# Patient Record
Sex: FEMALE | Race: WHITE | NOT HISPANIC OR LATINO | Employment: OTHER | URBAN - METROPOLITAN AREA
[De-identification: names, ages, dates, MRNs, and addresses within clinical notes are randomized per-mention and may not be internally consistent; named-entity substitution may affect disease eponyms.]

---

## 2023-07-19 LAB
AMPHETAMINE (PRESENCE) IN URINE BY SCREEN METHOD: ABNORMAL
BARBITURATES PRESENCE IN URINE BY SCREEN METHOD: ABNORMAL
BENZODIAZEPINE (PRESENCE) IN URINE BY SCREEN METHOD: ABNORMAL
CANNABINOIDS IN URINE BY SCREEN METHOD: ABNORMAL
COCAINE (PRESENCE) IN URINE BY SCREEN METHOD: ABNORMAL
DRUG SCREEN COMMENT URINE: ABNORMAL
FENTANYL URINE: ABNORMAL
METHADONE (PRESENCE) IN URINE BY SCREEN METHOD: ABNORMAL
OPIATES (PRESENCE) IN URINE BY SCREEN METHOD: ABNORMAL
OXYCODONE (PRESENCE) IN URINE BY SCREEN METHOD: ABNORMAL
PHENCYCLIDINE (PRESENCE) IN URINE BY SCREEN METHOD: ABNORMAL

## 2023-07-27 LAB
AMPHETAMINES,URINE: <50 NG/ML
MDA,URINE: <200 NG/ML
MDEA,URINE: <200 NG/ML
MDMA,UR: <200 NG/ML
METHAMPHETAMINE QUANTITATIVE URINE: <200 NG/ML
PHENTERMINE,UR: <200 NG/ML

## 2023-10-04 DIAGNOSIS — I47.29 PAROXYSMAL VENTRICULAR TACHYCARDIA (MULTI): ICD-10-CM

## 2023-10-04 DIAGNOSIS — Z95.810 PRESENCE OF AUTOMATIC CARDIOVERTER/DEFIBRILLATOR (AICD): ICD-10-CM

## 2023-10-18 DIAGNOSIS — I47.10 PAROXYSMAL SUPRAVENTRICULAR TACHYCARDIA (CMS-HCC): ICD-10-CM

## 2023-10-18 DIAGNOSIS — Z95.810 PRESENCE OF AUTOMATIC CARDIOVERTER/DEFIBRILLATOR (AICD): ICD-10-CM

## 2023-10-18 DIAGNOSIS — I47.29 PAROXYSMAL VENTRICULAR TACHYCARDIA (MULTI): ICD-10-CM

## 2023-11-01 ENCOUNTER — HOSPITAL ENCOUNTER (OUTPATIENT)
Dept: CARDIOLOGY | Facility: CLINIC | Age: 83
Discharge: HOME | End: 2023-11-01
Payer: MEDICARE

## 2023-11-01 DIAGNOSIS — Z95.810 PRESENCE OF AUTOMATIC CARDIOVERTER/DEFIBRILLATOR (AICD): ICD-10-CM

## 2023-11-01 DIAGNOSIS — I47.10 PAROXYSMAL SUPRAVENTRICULAR TACHYCARDIA (CMS-HCC): ICD-10-CM

## 2023-11-01 DIAGNOSIS — I47.29 PAROXYSMAL VENTRICULAR TACHYCARDIA (MULTI): ICD-10-CM

## 2023-11-07 ENCOUNTER — HOSPITAL ENCOUNTER (OUTPATIENT)
Dept: CARDIOLOGY | Facility: CLINIC | Age: 83
Discharge: HOME | End: 2023-11-07
Payer: MEDICARE

## 2023-11-07 DIAGNOSIS — I47.29 PAROXYSMAL VENTRICULAR TACHYCARDIA (MULTI): ICD-10-CM

## 2023-11-07 DIAGNOSIS — Z95.810 PRESENCE OF AUTOMATIC CARDIOVERTER/DEFIBRILLATOR (AICD): ICD-10-CM

## 2023-11-20 ENCOUNTER — HOSPITAL ENCOUNTER (OUTPATIENT)
Dept: CARDIOLOGY | Facility: CLINIC | Age: 83
Discharge: HOME | End: 2023-11-20
Payer: MEDICARE

## 2023-11-20 DIAGNOSIS — Z95.810 PRESENCE OF AUTOMATIC CARDIOVERTER/DEFIBRILLATOR (AICD): ICD-10-CM

## 2023-11-20 DIAGNOSIS — I47.29 PAROXYSMAL VENTRICULAR TACHYCARDIA (MULTI): ICD-10-CM

## 2023-11-27 ENCOUNTER — HOSPITAL ENCOUNTER (OUTPATIENT)
Dept: CARDIOLOGY | Facility: CLINIC | Age: 83
Discharge: HOME | End: 2023-11-27
Payer: MEDICARE

## 2023-11-27 DIAGNOSIS — I47.29 PAROXYSMAL VENTRICULAR TACHYCARDIA (MULTI): ICD-10-CM

## 2023-11-27 DIAGNOSIS — Z95.810 PRESENCE OF AUTOMATIC CARDIOVERTER/DEFIBRILLATOR (AICD): ICD-10-CM

## 2023-12-11 ENCOUNTER — HOSPITAL ENCOUNTER (OUTPATIENT)
Dept: CARDIOLOGY | Facility: CLINIC | Age: 83
Discharge: HOME | End: 2023-12-11
Payer: MEDICARE

## 2023-12-11 DIAGNOSIS — I47.29 PAROXYSMAL VENTRICULAR TACHYCARDIA (MULTI): ICD-10-CM

## 2023-12-11 DIAGNOSIS — Z95.810 PRESENCE OF AUTOMATIC CARDIOVERTER/DEFIBRILLATOR (AICD): ICD-10-CM

## 2023-12-27 ENCOUNTER — HOSPITAL ENCOUNTER (OUTPATIENT)
Dept: CARDIOLOGY | Facility: CLINIC | Age: 83
Discharge: HOME | End: 2023-12-27
Payer: MEDICARE

## 2023-12-27 DIAGNOSIS — I47.29 PAROXYSMAL VENTRICULAR TACHYCARDIA (MULTI): ICD-10-CM

## 2023-12-27 DIAGNOSIS — Z95.810 PRESENCE OF AUTOMATIC CARDIOVERTER/DEFIBRILLATOR (AICD): ICD-10-CM

## 2023-12-27 PROCEDURE — 93296 REM INTERROG EVL PM/IDS: CPT

## 2023-12-27 PROCEDURE — 93295 DEV INTERROG REMOTE 1/2/MLT: CPT | Performed by: INTERNAL MEDICINE

## 2024-01-02 ENCOUNTER — HOSPITAL ENCOUNTER (OUTPATIENT)
Dept: CARDIOLOGY | Facility: CLINIC | Age: 84
Discharge: HOME | End: 2024-01-02
Payer: MEDICARE

## 2024-01-02 DIAGNOSIS — I47.29 PAROXYSMAL VENTRICULAR TACHYCARDIA (MULTI): ICD-10-CM

## 2024-01-02 DIAGNOSIS — Z95.810 PRESENCE OF AUTOMATIC CARDIOVERTER/DEFIBRILLATOR (AICD): ICD-10-CM

## 2024-01-08 ENCOUNTER — HOSPITAL ENCOUNTER (OUTPATIENT)
Dept: CARDIOLOGY | Facility: CLINIC | Age: 84
Discharge: HOME | End: 2024-01-08
Payer: MEDICARE

## 2024-01-08 DIAGNOSIS — Z95.810 PRESENCE OF AUTOMATIC CARDIOVERTER/DEFIBRILLATOR (AICD): ICD-10-CM

## 2024-01-08 DIAGNOSIS — I47.29 PAROXYSMAL VENTRICULAR TACHYCARDIA (MULTI): ICD-10-CM

## 2024-01-10 ENCOUNTER — HOSPITAL ENCOUNTER (OUTPATIENT)
Dept: CARDIOLOGY | Facility: CLINIC | Age: 84
Discharge: HOME | End: 2024-01-10
Payer: MEDICARE

## 2024-01-10 DIAGNOSIS — I47.29 PAROXYSMAL VENTRICULAR TACHYCARDIA (MULTI): ICD-10-CM

## 2024-01-10 DIAGNOSIS — Z95.810 PRESENCE OF AUTOMATIC CARDIOVERTER/DEFIBRILLATOR (AICD): ICD-10-CM

## 2024-01-17 ENCOUNTER — HOSPITAL ENCOUNTER (OUTPATIENT)
Dept: CARDIOLOGY | Facility: CLINIC | Age: 84
Discharge: HOME | End: 2024-01-17
Payer: MEDICARE

## 2024-01-17 DIAGNOSIS — I47.29 PAROXYSMAL VENTRICULAR TACHYCARDIA (MULTI): ICD-10-CM

## 2024-01-17 DIAGNOSIS — Z95.810 PRESENCE OF AUTOMATIC CARDIOVERTER/DEFIBRILLATOR (AICD): ICD-10-CM

## 2024-01-25 ENCOUNTER — HOSPITAL ENCOUNTER (OUTPATIENT)
Dept: CARDIOLOGY | Facility: CLINIC | Age: 84
Discharge: HOME | End: 2024-01-25
Payer: MEDICARE

## 2024-01-25 DIAGNOSIS — I47.29 PAROXYSMAL VENTRICULAR TACHYCARDIA (MULTI): ICD-10-CM

## 2024-01-25 DIAGNOSIS — Z95.810 PRESENCE OF AUTOMATIC CARDIOVERTER/DEFIBRILLATOR (AICD): ICD-10-CM

## 2024-01-29 ENCOUNTER — HOSPITAL ENCOUNTER (OUTPATIENT)
Dept: CARDIOLOGY | Facility: CLINIC | Age: 84
Discharge: HOME | End: 2024-01-29
Payer: MEDICARE

## 2024-01-29 DIAGNOSIS — I47.29 PAROXYSMAL VENTRICULAR TACHYCARDIA (MULTI): ICD-10-CM

## 2024-01-29 DIAGNOSIS — Z95.810 PRESENCE OF AUTOMATIC CARDIOVERTER/DEFIBRILLATOR (AICD): ICD-10-CM

## 2024-01-29 DIAGNOSIS — I47.10 PAROXYSMAL SUPRAVENTRICULAR TACHYCARDIA (CMS-HCC): ICD-10-CM

## 2024-01-31 DIAGNOSIS — I47.10 PAROXYSMAL SUPRAVENTRICULAR TACHYCARDIA (CMS-HCC): ICD-10-CM

## 2024-01-31 RX ORDER — MEXILETINE HYDROCHLORIDE 250 MG/1
250 CAPSULE ORAL 3 TIMES DAILY
Qty: 270 CAPSULE | Refills: 3 | Status: SHIPPED | OUTPATIENT
Start: 2024-01-31

## 2024-01-31 RX ORDER — MEXILETINE HYDROCHLORIDE 250 MG/1
250 CAPSULE ORAL 3 TIMES DAILY
COMMUNITY
End: 2024-01-31 | Stop reason: SDUPTHER

## 2024-02-05 ENCOUNTER — HOSPITAL ENCOUNTER (OUTPATIENT)
Dept: CARDIOLOGY | Facility: CLINIC | Age: 84
Discharge: HOME | End: 2024-02-05
Payer: MEDICARE

## 2024-02-05 DIAGNOSIS — Z95.810 PRESENCE OF AUTOMATIC CARDIOVERTER/DEFIBRILLATOR (AICD): ICD-10-CM

## 2024-02-05 DIAGNOSIS — I47.29 PAROXYSMAL VENTRICULAR TACHYCARDIA (MULTI): ICD-10-CM

## 2024-02-12 ENCOUNTER — HOSPITAL ENCOUNTER (OUTPATIENT)
Dept: CARDIOLOGY | Facility: CLINIC | Age: 84
Discharge: HOME | End: 2024-02-12
Payer: MEDICARE

## 2024-02-12 DIAGNOSIS — I47.29 PAROXYSMAL VENTRICULAR TACHYCARDIA (MULTI): ICD-10-CM

## 2024-02-19 DIAGNOSIS — I47.29 PAROXYSMAL VENTRICULAR TACHYCARDIA (MULTI): ICD-10-CM

## 2024-02-19 RX ORDER — METOPROLOL SUCCINATE 50 MG/1
50 TABLET, EXTENDED RELEASE ORAL DAILY
COMMUNITY
End: 2024-02-19 | Stop reason: SDUPTHER

## 2024-02-20 RX ORDER — METOPROLOL SUCCINATE 50 MG/1
50 TABLET, EXTENDED RELEASE ORAL DAILY
Qty: 90 TABLET | Refills: 3 | Status: SHIPPED | OUTPATIENT
Start: 2024-02-20 | End: 2025-02-19

## 2024-02-21 ENCOUNTER — HOSPITAL ENCOUNTER (OUTPATIENT)
Dept: CARDIOLOGY | Facility: CLINIC | Age: 84
Discharge: HOME | End: 2024-02-21
Payer: MEDICARE

## 2024-02-21 DIAGNOSIS — I47.29 PAROXYSMAL VENTRICULAR TACHYCARDIA (MULTI): ICD-10-CM

## 2024-02-21 DIAGNOSIS — Z95.810 PRESENCE OF AUTOMATIC CARDIOVERTER/DEFIBRILLATOR (AICD): ICD-10-CM

## 2024-02-24 DIAGNOSIS — E03.9 HYPOTHYROIDISM, UNSPECIFIED: ICD-10-CM

## 2024-02-26 ENCOUNTER — HOSPITAL ENCOUNTER (OUTPATIENT)
Dept: CARDIOLOGY | Facility: CLINIC | Age: 84
Discharge: HOME | End: 2024-02-26
Payer: MEDICARE

## 2024-02-26 DIAGNOSIS — I47.29 PAROXYSMAL VENTRICULAR TACHYCARDIA (MULTI): ICD-10-CM

## 2024-02-26 DIAGNOSIS — Z95.810 PRESENCE OF AUTOMATIC CARDIOVERTER/DEFIBRILLATOR (AICD): ICD-10-CM

## 2024-02-26 RX ORDER — LEVOTHYROXINE SODIUM 125 UG/1
125 TABLET ORAL DAILY
Qty: 90 TABLET | Refills: 0 | Status: SHIPPED | OUTPATIENT
Start: 2024-02-26 | End: 2024-04-17 | Stop reason: SDUPTHER

## 2024-02-28 ENCOUNTER — HOSPITAL ENCOUNTER (OUTPATIENT)
Dept: CARDIOLOGY | Facility: CLINIC | Age: 84
Discharge: HOME | End: 2024-02-28
Payer: MEDICARE

## 2024-02-28 DIAGNOSIS — Z95.810 PRESENCE OF AUTOMATIC CARDIOVERTER/DEFIBRILLATOR (AICD): ICD-10-CM

## 2024-02-28 DIAGNOSIS — I47.29 PAROXYSMAL VENTRICULAR TACHYCARDIA (MULTI): ICD-10-CM

## 2024-03-03 PROCEDURE — 87186 SC STD MICRODIL/AGAR DIL: CPT

## 2024-03-03 PROCEDURE — 87086 URINE CULTURE/COLONY COUNT: CPT

## 2024-03-04 ENCOUNTER — LAB REQUISITION (OUTPATIENT)
Dept: LAB | Facility: HOSPITAL | Age: 84
End: 2024-03-04
Payer: MEDICARE

## 2024-03-04 ENCOUNTER — HOSPITAL ENCOUNTER (OUTPATIENT)
Dept: CARDIOLOGY | Facility: CLINIC | Age: 84
Discharge: HOME | End: 2024-03-04
Payer: MEDICARE

## 2024-03-04 ENCOUNTER — APPOINTMENT (OUTPATIENT)
Dept: CARDIOLOGY | Facility: CLINIC | Age: 84
End: 2024-03-04
Payer: MEDICARE

## 2024-03-04 DIAGNOSIS — I47.29 PAROXYSMAL VENTRICULAR TACHYCARDIA (MULTI): ICD-10-CM

## 2024-03-04 DIAGNOSIS — Z95.810 PRESENCE OF AUTOMATIC CARDIOVERTER/DEFIBRILLATOR (AICD): ICD-10-CM

## 2024-03-04 DIAGNOSIS — N39.0 URINARY TRACT INFECTION, SITE NOT SPECIFIED: ICD-10-CM

## 2024-03-07 ENCOUNTER — HOSPITAL ENCOUNTER (OUTPATIENT)
Dept: CARDIOLOGY | Facility: CLINIC | Age: 84
Discharge: HOME | End: 2024-03-07
Payer: MEDICARE

## 2024-03-07 ENCOUNTER — OFFICE VISIT (OUTPATIENT)
Dept: PRIMARY CARE | Facility: CLINIC | Age: 84
End: 2024-03-07
Payer: MEDICARE

## 2024-03-07 VITALS
WEIGHT: 154 LBS | HEIGHT: 67 IN | BODY MASS INDEX: 24.17 KG/M2 | HEART RATE: 60 BPM | DIASTOLIC BLOOD PRESSURE: 72 MMHG | SYSTOLIC BLOOD PRESSURE: 119 MMHG | TEMPERATURE: 96.6 F

## 2024-03-07 DIAGNOSIS — L30.4 INTERTRIGO: Primary | ICD-10-CM

## 2024-03-07 DIAGNOSIS — I49.01 VENTRICULAR FIBRILLATION (MULTI): ICD-10-CM

## 2024-03-07 DIAGNOSIS — Z95.810 PRESENCE OF AUTOMATIC CARDIOVERTER/DEFIBRILLATOR (AICD): ICD-10-CM

## 2024-03-07 DIAGNOSIS — I47.29 PAROXYSMAL VENTRICULAR TACHYCARDIA (MULTI): ICD-10-CM

## 2024-03-07 PROBLEM — I73.9 PERIPHERAL VASCULAR DISEASE, UNSPECIFIED (CMS-HCC): Status: ACTIVE | Noted: 2024-03-07

## 2024-03-07 PROBLEM — I50.32 CHRONIC DIASTOLIC (CONGESTIVE) HEART FAILURE (MULTI): Status: ACTIVE | Noted: 2024-03-07

## 2024-03-07 LAB — BACTERIA UR CULT: ABNORMAL

## 2024-03-07 PROCEDURE — 99214 OFFICE O/P EST MOD 30 MIN: CPT | Performed by: INTERNAL MEDICINE

## 2024-03-07 PROCEDURE — 1036F TOBACCO NON-USER: CPT | Performed by: INTERNAL MEDICINE

## 2024-03-07 PROCEDURE — 1159F MED LIST DOCD IN RCRD: CPT | Performed by: INTERNAL MEDICINE

## 2024-03-07 PROCEDURE — 1157F ADVNC CARE PLAN IN RCRD: CPT | Performed by: INTERNAL MEDICINE

## 2024-03-07 RX ORDER — CHOLECALCIFEROL (VITAMIN D3) 50 MCG
2000 TABLET ORAL DAILY
COMMUNITY

## 2024-03-07 RX ORDER — LOSARTAN POTASSIUM 50 MG/1
50 TABLET ORAL DAILY
COMMUNITY
End: 2024-04-29 | Stop reason: SDUPTHER

## 2024-03-07 RX ORDER — NAPROXEN SODIUM 220 MG/1
81 TABLET, FILM COATED ORAL DAILY
COMMUNITY

## 2024-03-07 RX ORDER — NITROFURANTOIN 25; 75 MG/1; MG/1
100 CAPSULE ORAL EVERY 12 HOURS SCHEDULED
COMMUNITY
Start: 2024-03-03 | End: 2024-04-17 | Stop reason: ALTCHOICE

## 2024-03-07 RX ORDER — PRAVASTATIN SODIUM 20 MG/1
20 TABLET ORAL EVERY OTHER DAY
COMMUNITY
Start: 2020-03-12 | End: 2024-04-17 | Stop reason: ALTCHOICE

## 2024-03-07 RX ORDER — CLOTRIMAZOLE AND BETAMETHASONE DIPROPIONATE 10; .64 MG/G; MG/G
1 CREAM TOPICAL 2 TIMES DAILY
Qty: 15 G | Refills: 0 | Status: SHIPPED | OUTPATIENT
Start: 2024-03-07 | End: 2024-03-12 | Stop reason: SDUPTHER

## 2024-03-07 RX ORDER — AA/PROT/LYSINE/METHIO/VIT C/B6 50-12.5 MG
20 TABLET ORAL DAILY
COMMUNITY

## 2024-03-07 RX ORDER — ALPRAZOLAM 0.25 MG/1
0.25 TABLET ORAL 2 TIMES DAILY
COMMUNITY

## 2024-03-07 RX ORDER — VITAMIN E MIXED 400 UNIT
800 CAPSULE ORAL DAILY
COMMUNITY

## 2024-03-07 RX ORDER — LANOLIN ALCOHOL/MO/W.PET/CERES
1 CREAM (GRAM) TOPICAL DAILY
COMMUNITY
Start: 2019-08-01

## 2024-03-07 RX ORDER — AMLODIPINE BESYLATE 2.5 MG/1
2.5 TABLET ORAL 2 TIMES DAILY
COMMUNITY

## 2024-03-07 ASSESSMENT — PATIENT HEALTH QUESTIONNAIRE - PHQ9
SUM OF ALL RESPONSES TO PHQ9 QUESTIONS 1 AND 2: 0
2. FEELING DOWN, DEPRESSED OR HOPELESS: NOT AT ALL
1. LITTLE INTEREST OR PLEASURE IN DOING THINGS: NOT AT ALL

## 2024-03-07 NOTE — PROGRESS NOTES
Subjective   Patient ID: Kaleigh Strong is a 83 y.o. female who presents for redness (Pubic area. Has taken Diflucan x 2, has used wipes to help with itching and nothing is helping) and Vaginal Itching.    HPI Pt is a pleasant  83 y.o. CF who was seen and evaluated during the OV with the hx of the itching at the groin area for the last week. Pt states that she was seen at the local  for this issue. Pt was treated with 2 doses of Diflucan and has no relief of the sxs. Pt denies any vaginal discharge/no STI exposure  During the clinical encounter pt denies fever, chills, no SOB, no chest pain/tightness, no abdominal pain, no N/V/D reported, no change of urination reported. Pt denies any other health concerns during this visit.  Sohx: reviewed  PMHx and Fhx: reviewed    Review of Systems  12 Systems have been reviewed as follows:   Constitutional: no fever, no chills  Eyes: no eyesight problems, no eye redness, no eye pain, no blurred vision  ENT: no ear pain or sore throat, no nasal discharge or congestion, no sinus pressure, no hearing loss  Cardiovascular: no chest pain or tightness, no SOB, the heart rate was not fast or slow  Respiratory: no cough, no dyspnea with exertion or with rest, no wheezing  Gastrointestinal: no abdominal pain, no constipation or diarrhea, no heartburn, no nausea or vomiting  Genitourinary: no urine frequency, no dysuria, no urinary urgency, no urinary incontinence or retention  Musculoskeletal: no back pain, no arthralgia, no joint swelling or stiffness, no muscle weakness  Integumentary: as mentioned  Neurological: no headaches, no dizziness or fainting, no limb weakness  Psychiatric: no mood changes, no anxiety or depression, no suicidal thoughts  Endocrine: no weight change, no temperature intolerance, no change of appetite  Hematologic/Lymphatic: no easy bruising or bleeding  Objective   /72 (BP Location: Right arm, Patient Position: Sitting)   Pulse 60   Temp 35.9 °C (96.6  "°F)   Ht 1.702 m (5' 7\")   Wt 69.9 kg (154 lb)   BMI 24.12 kg/m²     Physical Exam  Constitutional: well hydrated, well nourished, no acute distress. Vital signs reviewed  Head and face: normocephalic, atraumatic  Eyes: no erythema, edema or visible abnormalities of conjunctiva or lids. Pupils are equal, round and reactive to light. Extraocular movement normal  ENT: external ears without deformities  Neck: full ROM, no adenopathy, neck was supple  Pulmonary: normal respiratory rate and effort. Lungs are clear to auscultation, no rales,no rhonchi or wheezing  Cardiovascular: RRR, normal S1 and S2, no murmur, no gallop, posterior tib. pulse normal 2+ bilaterally, no lower extremity edema  Abdomen: soft, non tender on palpation, not distended, normal BS in all 4 quadrants  Musculoskeletal: no joint swelling, normal movements of all 4 extremities. Gait and station: pt is using a walker. Digits and nails: normal without clubbing  Skin: The gorin region evaluation was done after the verbal consent was secured form the pt. The PE revealed the area of skin erythema, diffuse moist patches in the left and right groin region (more prominent on the right side). No pain/fluctuation on palpation.  Neurologic: Cranial nerves: CN II: visual acuity intact. Source: acuity reported by patient. Pupils reactive to light with normal accommodation. Right and left pupil normal CN III, IV and VI: the EO movements were intact. CN VIII: no hearing loss.  Sensory exam: normal light to touch  Psychiatric: Mood and affect: normal, Alert and Oriented x 3  Lymphatic: no cervical lymphadenopathy  Assessment/Plan   Itchy skin rash, most likely Intertrigo:  Hx and PE as mentioned  Will start on 14 day Tx with clotrimazole betamethasone cream BID  Pt was instructed to contact PCP if pt will have no improvement of the condition/worsening of the sxs/new sxs on the current treatment  Plan was d/c with the pt in details, verbalized understanding, " agreed    Vfib: continue on metoprolol and Mexitil as prescribed  Please follow up with PCP as planned or sooner if needed

## 2024-03-08 PROBLEM — I47.10 PAROXYSMAL SUPRAVENTRICULAR TACHYCARDIA (CMS-HCC): Status: ACTIVE | Noted: 2024-03-07

## 2024-03-08 PROBLEM — I47.29 OTHER VENTRICULAR TACHYCARDIA (MULTI): Status: RESOLVED | Noted: 2024-03-07 | Resolved: 2024-03-08

## 2024-03-12 ENCOUNTER — TELEPHONE (OUTPATIENT)
Dept: CARDIOLOGY | Facility: CLINIC | Age: 84
End: 2024-03-12
Payer: MEDICARE

## 2024-03-12 DIAGNOSIS — L30.4 INTERTRIGO: ICD-10-CM

## 2024-03-12 RX ORDER — CLOTRIMAZOLE AND BETAMETHASONE DIPROPIONATE 10; .64 MG/G; MG/G
1 CREAM TOPICAL 2 TIMES DAILY
Qty: 15 G | Refills: 0 | Status: CANCELLED | OUTPATIENT
Start: 2024-03-12 | End: 2024-03-26

## 2024-03-12 RX ORDER — CLOTRIMAZOLE AND BETAMETHASONE DIPROPIONATE 10; .64 MG/G; MG/G
1 CREAM TOPICAL 2 TIMES DAILY
Qty: 15 G | Refills: 0 | Status: SHIPPED | OUTPATIENT
Start: 2024-03-12 | End: 2024-03-26

## 2024-03-12 NOTE — TELEPHONE ENCOUNTER
"Patient left voicemail stating she was seen in office on 3/7 for yeast infection. She stated the infection was \"so severe\" that she used all the medication (clotrimazole-betamethasone cream) that was prescribed, \"too fast\" and would like another Rx if possible  "

## 2024-03-13 ENCOUNTER — OFFICE VISIT (OUTPATIENT)
Dept: CARDIOLOGY | Facility: CLINIC | Age: 84
End: 2024-03-13
Payer: MEDICARE

## 2024-03-13 ENCOUNTER — HOSPITAL ENCOUNTER (OUTPATIENT)
Dept: CARDIOLOGY | Facility: CLINIC | Age: 84
Discharge: HOME | End: 2024-03-13
Payer: MEDICARE

## 2024-03-13 VITALS
SYSTOLIC BLOOD PRESSURE: 110 MMHG | OXYGEN SATURATION: 96 % | BODY MASS INDEX: 24.17 KG/M2 | HEIGHT: 67 IN | DIASTOLIC BLOOD PRESSURE: 64 MMHG | HEART RATE: 76 BPM | WEIGHT: 154 LBS

## 2024-03-13 DIAGNOSIS — I47.29 OTHER VENTRICULAR TACHYCARDIA (MULTI): ICD-10-CM

## 2024-03-13 DIAGNOSIS — I50.32 CHRONIC DIASTOLIC (CONGESTIVE) HEART FAILURE (MULTI): Primary | ICD-10-CM

## 2024-03-13 DIAGNOSIS — I50.22 CHRONIC SYSTOLIC HEART FAILURE (MULTI): ICD-10-CM

## 2024-03-13 DIAGNOSIS — Z95.810 PRESENCE OF AUTOMATIC (IMPLANTABLE) CARDIAC DEFIBRILLATOR: ICD-10-CM

## 2024-03-13 DIAGNOSIS — I47.29 PAROXYSMAL VENTRICULAR TACHYCARDIA (MULTI): ICD-10-CM

## 2024-03-13 PROBLEM — I47.20 PAROXYSMAL VENTRICULAR TACHYCARDIA: Status: ACTIVE | Noted: 2024-03-07

## 2024-03-13 PROCEDURE — 1157F ADVNC CARE PLAN IN RCRD: CPT | Performed by: INTERNAL MEDICINE

## 2024-03-13 PROCEDURE — 93283 PRGRMG EVAL IMPLANTABLE DFB: CPT | Performed by: INTERNAL MEDICINE

## 2024-03-13 PROCEDURE — 93283 PRGRMG EVAL IMPLANTABLE DFB: CPT

## 2024-03-13 PROCEDURE — 99213 OFFICE O/P EST LOW 20 MIN: CPT | Performed by: INTERNAL MEDICINE

## 2024-03-13 PROCEDURE — 1159F MED LIST DOCD IN RCRD: CPT | Performed by: INTERNAL MEDICINE

## 2024-03-13 PROCEDURE — 1036F TOBACCO NON-USER: CPT | Performed by: INTERNAL MEDICINE

## 2024-03-13 NOTE — PROGRESS NOTES
"History Of Present Illness:      This is an 83-year-old female with ventricular tachycardia and Abbott ICD insertion.  She was last seen in the office in August 2023.  She has a right sided ICD which was implanted 2021 at [facility] [place].  The left-sided ICD has remained in place, with a fractured lead.  The patient did not want the defibrillator removed.    Past medical history:    Ischemic cardiomyopathy with prior anterior wall MI, ejection fraction 35 to 40%  Ventricular tachycardia, history of ICD insertion 2009, replacement in 2016, and lead fracture 2021.  Left subclavian vein stenosis.  New ICD insertion right infraclavicular region 2021 at [facility] [place]  CAD, with history of stent placement    Review of Systems  Other review of systems negative     Last Recorded Vitals:      11/9/2022     1:06 PM 1/18/2023     2:07 PM 3/22/2023    11:47 AM 7/19/2023    12:09 PM 8/23/2023     3:27 PM 3/7/2024    11:05 AM 3/13/2024     2:04 PM   Vitals   Systolic 144 136 120 118 150 119 110   Diastolic 80 79 80 71 80 72 64   Heart Rate 50 73 68 90 73 60 76   Temp  36 °C (96.8 °F)  36.3 °C (97.3 °F)  35.9 °C (96.6 °F)    Height (in) 1.702 m (5' 7\") 1.702 m (5' 7\")  1.702 m (5' 7\") 1.702 m (5' 7\") 1.702 m (5' 7\") 1.702 m (5' 7\")   Weight (lb) 165 166 163.25 161 161 154 154   BMI 25.84 kg/m2 26 kg/m2 25.57 kg/m2 25.22 kg/m2 25.22 kg/m2 24.12 kg/m2 24.12 kg/m2   BSA (m2) 1.88 m2 1.89 m2 1.87 m2 1.86 m2 1.86 m2 1.82 m2 1.82 m2   Visit Report      Report Report          Allergies:  Patient has no known allergies.    Outpatient Medications:  Current Outpatient Medications   Medication Instructions    ALPRAZolam (XANAX) 0.25 mg, oral, 2 times daily, prn    amLODIPine (NORVASC) 2.5 mg, oral, 2 times daily    aspirin 81 mg, oral, Daily    cholecalciferol (VITAMIN D-3) 2,000 Units, oral, Daily    clotrimazole-betamethasone (Lotrisone) cream 1 Application, Topical, 2 times daily    coenzyme Q-10 (CO Q-10) 20 mg, oral, " Daily    levothyroxine (SYNTHROID, LEVOXYL) 125 mcg, oral, Daily, as directed    losartan (COZAAR) 50 mg, oral, Daily    magnesium oxide (Mag-Ox) 400 mg (241.3 mg magnesium) tablet 1 tablet, oral, Daily    metoprolol succinate XL (TOPROL-XL) 50 mg, oral, Daily    mexiletine (MEXITIL) 250 mg, oral, 3 times daily    MULTIVITAMIN ORAL oral, Daily    nitrofurantoin, macrocrystal-monohydrate, (Macrobid) 100 mg capsule 100 mg, oral, Every 12 hours scheduled    pravastatin (PRAVACHOL) 20 mg, oral, Every other day    vitamin E 800 Units, oral, Daily       Physical Exam:    General Appearance:  Alert, oriented, no distress  Skin:  Warm and dry  Head and Neck:  No elevation of JVP, no carotid bruits  Cardiac Exam:  Rhythm is regular, S1 and S2 are normal, no murmur S3 or S4  Lungs:  Clear to auscultation  Extremities:  no edema  Neurologic:  No focal deficits  Psychiatric:  Appropriate mood and behavior         Cardiology Tests:  I have personally review the diagnostic cardiac testing and my interpretation is as follows:    ICD interrogation: Normal device function  Echocardiogram June 2021: Ejection fraction 35 to 40%    Assessment/Plan   Problem List Items Addressed This Visit             ICD-10-CM    Chronic diastolic (congestive) heart failure (CMS/HCC) - Primary I50.32    Paroxysmal ventricular tachycardia (CMS/HCC) I47.29    Chronic systolic heart failure (CMS/HCC) I50.22     1.  Ventricular tachycardia: This patient has an Abbott ICD in the right pectoral region.  The device is being followed through the defibrillator clinic and is functioning appropriately.  The incision site is well-healed with no sign of infection or erosion.  EP follow-up in 6 to 8 months.    2.  Ischemic cardiomyopathy: The patient has both systolic and diastolic CHF.  No signs of CHF on examination and her ejection fraction has been in the range of 35 to 40%.  Continue same medication regimen as outlined by Dr. Morgan.            Messi MICHEL  Ramicone, DO

## 2024-03-13 NOTE — ASSESSMENT & PLAN NOTE
1.  Ventricular tachycardia: This patient has an Abbott ICD in the right pectoral region.  The device is being followed through the defibrillator clinic and is functioning appropriately.  The incision site is well-healed with no sign of infection or erosion.  EP follow-up in 6 to 8 months.    2.  Ischemic cardiomyopathy: The patient has both systolic and diastolic CHF.  No signs of CHF on examination and her ejection fraction has been in the range of 35 to 40%.  Continue same medication regimen as outlined by Dr. Morgan.

## 2024-04-01 ENCOUNTER — HOSPITAL ENCOUNTER (OUTPATIENT)
Dept: CARDIOLOGY | Facility: CLINIC | Age: 84
Discharge: HOME | End: 2024-04-01
Payer: MEDICARE

## 2024-04-01 DIAGNOSIS — Z95.810 PRESENCE OF AUTOMATIC CARDIOVERTER/DEFIBRILLATOR (AICD): ICD-10-CM

## 2024-04-01 DIAGNOSIS — I47.29 PAROXYSMAL VENTRICULAR TACHYCARDIA (MULTI): ICD-10-CM

## 2024-04-02 ENCOUNTER — HOSPITAL ENCOUNTER (OUTPATIENT)
Dept: CARDIOLOGY | Facility: CLINIC | Age: 84
Discharge: HOME | End: 2024-04-02
Payer: MEDICARE

## 2024-04-02 DIAGNOSIS — I47.29 PAROXYSMAL VENTRICULAR TACHYCARDIA (MULTI): ICD-10-CM

## 2024-04-02 DIAGNOSIS — Z95.810 PRESENCE OF AUTOMATIC CARDIOVERTER/DEFIBRILLATOR (AICD): ICD-10-CM

## 2024-04-09 ENCOUNTER — HOSPITAL ENCOUNTER (OUTPATIENT)
Dept: CARDIOLOGY | Facility: CLINIC | Age: 84
Discharge: HOME | End: 2024-04-09
Payer: MEDICARE

## 2024-04-09 DIAGNOSIS — Z95.810 PRESENCE OF AUTOMATIC CARDIOVERTER/DEFIBRILLATOR (AICD): ICD-10-CM

## 2024-04-09 DIAGNOSIS — I47.29 PAROXYSMAL VENTRICULAR TACHYCARDIA (MULTI): ICD-10-CM

## 2024-04-15 ENCOUNTER — OFFICE VISIT (OUTPATIENT)
Dept: CARDIOLOGY | Facility: CLINIC | Age: 84
End: 2024-04-15
Payer: MEDICARE

## 2024-04-15 ENCOUNTER — HOSPITAL ENCOUNTER (OUTPATIENT)
Dept: CARDIOLOGY | Facility: CLINIC | Age: 84
Discharge: HOME | End: 2024-04-15
Payer: MEDICARE

## 2024-04-15 VITALS
BODY MASS INDEX: 24.87 KG/M2 | SYSTOLIC BLOOD PRESSURE: 132 MMHG | OXYGEN SATURATION: 96 % | RESPIRATION RATE: 16 BRPM | WEIGHT: 158.8 LBS | HEART RATE: 65 BPM | DIASTOLIC BLOOD PRESSURE: 66 MMHG

## 2024-04-15 DIAGNOSIS — I50.32 CHRONIC DIASTOLIC (CONGESTIVE) HEART FAILURE (MULTI): ICD-10-CM

## 2024-04-15 DIAGNOSIS — I10 PRIMARY HYPERTENSION: ICD-10-CM

## 2024-04-15 DIAGNOSIS — E78.5 DYSLIPIDEMIA, GOAL LDL BELOW 70: ICD-10-CM

## 2024-04-15 DIAGNOSIS — Z95.810 PRESENCE OF AUTOMATIC CARDIOVERTER/DEFIBRILLATOR (AICD): ICD-10-CM

## 2024-04-15 DIAGNOSIS — I47.29 PAROXYSMAL VENTRICULAR TACHYCARDIA (MULTI): ICD-10-CM

## 2024-04-15 DIAGNOSIS — I50.22 CHRONIC SYSTOLIC HEART FAILURE (MULTI): Primary | ICD-10-CM

## 2024-04-15 DIAGNOSIS — I25.10 ASHD (ARTERIOSCLEROTIC HEART DISEASE): ICD-10-CM

## 2024-04-15 DIAGNOSIS — Z95.810 ICD (IMPLANTABLE CARDIOVERTER-DEFIBRILLATOR) IN PLACE: ICD-10-CM

## 2024-04-15 DIAGNOSIS — I35.0 NONRHEUMATIC AORTIC VALVE STENOSIS: ICD-10-CM

## 2024-04-15 PROCEDURE — 1160F RVW MEDS BY RX/DR IN RCRD: CPT | Performed by: STUDENT IN AN ORGANIZED HEALTH CARE EDUCATION/TRAINING PROGRAM

## 2024-04-15 PROCEDURE — 99213 OFFICE O/P EST LOW 20 MIN: CPT | Performed by: STUDENT IN AN ORGANIZED HEALTH CARE EDUCATION/TRAINING PROGRAM

## 2024-04-15 PROCEDURE — 1157F ADVNC CARE PLAN IN RCRD: CPT | Performed by: STUDENT IN AN ORGANIZED HEALTH CARE EDUCATION/TRAINING PROGRAM

## 2024-04-15 PROCEDURE — 3075F SYST BP GE 130 - 139MM HG: CPT | Performed by: STUDENT IN AN ORGANIZED HEALTH CARE EDUCATION/TRAINING PROGRAM

## 2024-04-15 PROCEDURE — 1159F MED LIST DOCD IN RCRD: CPT | Performed by: STUDENT IN AN ORGANIZED HEALTH CARE EDUCATION/TRAINING PROGRAM

## 2024-04-15 PROCEDURE — 1036F TOBACCO NON-USER: CPT | Performed by: STUDENT IN AN ORGANIZED HEALTH CARE EDUCATION/TRAINING PROGRAM

## 2024-04-15 PROCEDURE — 3078F DIAST BP <80 MM HG: CPT | Performed by: STUDENT IN AN ORGANIZED HEALTH CARE EDUCATION/TRAINING PROGRAM

## 2024-04-15 ASSESSMENT — ENCOUNTER SYMPTOMS
CONSTITUTIONAL NEGATIVE: 1
GASTROINTESTINAL NEGATIVE: 1
RESPIRATORY NEGATIVE: 1
PSYCHIATRIC NEGATIVE: 1
HEMATOLOGIC/LYMPHATIC NEGATIVE: 1
ALLERGIC/IMMUNOLOGIC NEGATIVE: 1
PALPITATIONS: 0
ORTHOPNEA: 0
EYES NEGATIVE: 1
NEUROLOGICAL NEGATIVE: 1
PND: 0
ENDOCRINE NEGATIVE: 1
DYSPNEA ON EXERTION: 0
SYNCOPE: 0
MUSCULOSKELETAL NEGATIVE: 1
NEAR-SYNCOPE: 0

## 2024-04-15 NOTE — PROGRESS NOTES
[facility] Outpatient Follow-up Visit     Reason for Visit: ischemic cardiomyopathy     HPI: Kaleigh Strong is a 83 y.o.  female who presents today for a follow-up for ischemic cardiomyopathy. Past medical history of CAD s/p remote anterior MI, ischemic cardiomyopathy, chronic systolic + diastolic heart failure (NYHA II/B; LVEF 35-40% per TTE 6/2021), mild calcific AS, mild mitral stenosis, and Hx ICD ([facility]- s/p RV lead fracture repair summer 2021).      Patient was previously seen in cardiology clinic on 11/9/2022. Patient had no active cardiac complaints at the time. Muscle aches improved after reducing dose of pravastatin. Of note, patient intolerant to high-dose simvastatin, intolerant even of lower doses of atorvastatin, able to tolerate modest doses of pravastatin. Patient's primary cardiologist left, patient referred to re-establish care with cardiology.      Kaleigh presented to cardiology clinic on 3/22/2023. No active cardiac complaints at this time. Patient was switched from Entresto to losartan (entresto was prohibitively expensive).      Kaleigh returned to general cardiology clinic on 4/15/2024. Patient is currently asymptomatic from a CV perspective. Tolerating current medication regiment; no recent heart failure exacerbations or hospitalizations. Appears euvolemic on exam. No recent ICD therapies. No chest pain, dyspnea, orthopnea, PND, syncope, presyncope, or pedal edema.     Past Medical History:   She has a past medical history of Personal history of other diseases of the circulatory system.    Surgical History:   She has a past surgical history that includes Coronary angioplasty (11/05/2014); Kidney surgery (11/05/2014); Other surgical history (11/05/2014); Other surgical history (11/05/2014); Other surgical history (11/17/2021); and Other surgical history (11/22/2021).    Family History:   - Reviewed; non-contributory     Allergies:  Patient has no known allergies.     Social  History:   - non-smoker; no illicit drug use    Prior Cardiovascular Testing (Personally Reviewed):     Coronary angiography (2/2019)- demonstrated Left anterior descending artery was totally occluded at the stent site with collateral flow; RCA- mild disease; Stent to left circumflex- widely patent     TTE (6/2021)- Anterior hypokinesis; moderate left ventricular dysfunction with diastolic dysfunction; LVEF 35 to 40%; mild calcific aortic stenosis; mild mitral stenosis     Review of Systems:  Review of Systems   Constitutional: Negative.   HENT: Negative.     Eyes: Negative.    Cardiovascular:  Negative for chest pain, dyspnea on exertion, near-syncope, orthopnea, palpitations, paroxysmal nocturnal dyspnea and syncope.   Respiratory: Negative.     Endocrine: Negative.    Hematologic/Lymphatic: Negative.    Skin: Negative.    Musculoskeletal: Negative.    Gastrointestinal: Negative.    Genitourinary: Negative.    Neurological: Negative.    Psychiatric/Behavioral: Negative.     Allergic/Immunologic: Negative.        Outpatient Medications:    Current Outpatient Medications:     ALPRAZolam (Xanax) 0.25 mg tablet, Take 1 tablet (0.25 mg) by mouth 2 times a day. prn, Disp: , Rfl:     amLODIPine (Norvasc) 2.5 mg tablet, Take 1 tablet (2.5 mg) by mouth 2 times a day., Disp: , Rfl:     aspirin 81 mg chewable tablet, Chew 1 tablet (81 mg) once daily., Disp: , Rfl:     cholecalciferol (Vitamin D-3) 50 MCG (2000 UT) tablet, Take 1 tablet (2,000 Units) by mouth once daily., Disp: , Rfl:     coenzyme Q-10 (Co Q-10) 10 mg capsule, Take 2 capsules (20 mg) by mouth once daily., Disp: , Rfl:     levothyroxine (Synthroid, Levoxyl) 125 mcg tablet, TAKE 1 TABLET BY MOUTH EVERY DAY AS DIRECTED, Disp: 90 tablet, Rfl: 0    losartan (Cozaar) 50 mg tablet, Take 1 tablet (50 mg) by mouth once daily., Disp: , Rfl:     magnesium oxide (Mag-Ox) 400 mg (241.3 mg magnesium) tablet, Take 1 tablet (400 mg) by mouth once daily., Disp: , Rfl:      metoprolol succinate XL (Toprol-XL) 50 mg 24 hr tablet, Take 1 tablet (50 mg) by mouth once daily., Disp: 90 tablet, Rfl: 3    mexiletine (Mexitil) 250 mg capsule, Take 1 capsule (250 mg) by mouth 3 times a day., Disp: 270 capsule, Rfl: 3    MULTIVITAMIN ORAL, Take by mouth once daily., Disp: , Rfl:     nitrofurantoin, macrocrystal-monohydrate, (Macrobid) 100 mg capsule, Take 1 capsule (100 mg) by mouth every 12 hours., Disp: , Rfl:     pravastatin (Pravachol) 20 mg tablet, Take 1 tablet (20 mg) by mouth every other day., Disp: , Rfl:     vitamin E 180 mg (400 unit) capsule, Take 2 capsules (800 Units) by mouth once daily., Disp: , Rfl:      Last Recorded Vitals  /66 (BP Location: Left arm, Patient Position: Sitting)   Pulse 65   Resp 16   Wt 72 kg (158 lb 12.8 oz)   SpO2 96%   BMI 24.87 kg/m²     Physical Exam:    Physical Exam  Constitutional:       General: She is not in acute distress.  HENT:      Head: Normocephalic.      Mouth/Throat:      Mouth: Mucous membranes are moist.   Eyes:      Extraocular Movements: Extraocular movements intact.      Conjunctiva/sclera: Conjunctivae normal.   Neck:      Vascular: No carotid bruit or JVD.   Cardiovascular:      Rate and Rhythm: Normal rate and regular rhythm.      Pulses: Normal pulses.      Heart sounds: No murmur heard.  Pulmonary:      Effort: Pulmonary effort is normal. No respiratory distress.      Breath sounds: Normal breath sounds.   Abdominal:      General: Bowel sounds are normal. There is no distension.      Palpations: Abdomen is soft.   Musculoskeletal:         General: No swelling.   Skin:     General: Skin is warm and dry.   Neurological:      General: No focal deficit present.      Mental Status: She is alert.      Cranial Nerves: No cranial nerve deficit.      Motor: No weakness.   Psychiatric:         Mood and Affect: Mood normal.         Behavior: Behavior normal.         Lab/Radiology/Diagnostic Review:    Labs    Lab Results   Component  "Value Date    GLUCOSE 109 (H) 05/23/2022    CALCIUM 9.6 05/23/2022     (L) 05/23/2022    K 4.4 05/23/2022    CO2 27 05/23/2022     05/23/2022    BUN 17 05/23/2022    CREATININE 0.69 05/23/2022       Lab Results   Component Value Date    WBC 5.6 05/23/2022    HGB 13.6 05/23/2022    HCT 43.1 05/23/2022    MCV 92 05/23/2022     05/23/2022       Lab Results   Component Value Date    CHOL 191 05/23/2022    CHOL 208 (H) 07/20/2020    CHOL 171 05/29/2019     Lab Results   Component Value Date    HDL 37.0 (A) 05/23/2022    HDL 35.0 (A) 07/20/2020    HDL 34.0 (A) 05/29/2019     No results found for: \"LDLCALC\"  Lab Results   Component Value Date    TRIG 328 (H) 05/23/2022    TRIG 384 (H) 07/20/2020    TRIG 230 (H) 05/29/2019     No components found for: \"CHOLHDL\"    Lab Results   Component Value Date     (H) 06/13/2021     (H) 06/05/2021       Lab Results   Component Value Date    TSH 2.77 05/23/2022       Assessment:    83 y.o.  female who presents today for a follow-up for ischemic cardiomyopathy. Past medical history of CAD s/p remote anterior MI, ischemic cardiomyopathy, chronic systolic + diastolic heart failure (NYHA II/B; LVEF 35-40% per TTE 6/2021), mild calcific AS, mild mitral stenosis, and Hx ICD ([facility]- s/p RV lead fracture repair summer 2021).     Overall Plan:  1) Chronic systolic and diastolic HF (LVEF 35-40%); secondary to ischemic cardiomyopathy- continue losartan, metoprolol succinate, statin, aspirin; add MRA as tolerated     2) Hx CAD s/p remote LCx, LAD PCI- known occluded LAD (fills via collaterals); continue aspirin + statin, beta blockade     3) Hx VT; ICD in place- follow-up as directed with EP; continue mexiletine     4) HTN (goal BP < 130/90 mmHg)- continue losartan, metoprolol, amlodipine     5) Encouraged low sodium diet (< 2 grams daily); regular physical activity     6) DLD- continue pravastatin; dietary and lifestyle modifications      7) Mild AS- " per TTE 6/2021; follow with serial imaging / clinically repeat TTE in 1-2 years    Disposition- Follow-up in cardiology clinic in 6-12 momths    Thank you for your visit today. Please contact our office with any questions.     Palomo Morgan MD

## 2024-04-15 NOTE — PATIENT INSTRUCTIONS
Thank you for your visit today. Please contact our office (via MyChart or phone) with any additional questions.     [facility]    Melanie, RN/Clinic Nurse for:    Dr. Cathy Rajan    [address], [address]  [place] [address]    Phone: [phone] Press Option 5 then Option 3 to speak with the Clinic Nurse (Melanie)    _____    To Reach:    Billing Questions -    [phone]  Scheduling / Rescheduling -  Option 1  Refills / Medication Requests -  Option 3  General Office / [place] -  Option 4  Results -     Option 6  Medical Records -    Option 7  Repeat Options -    Option 9

## 2024-04-17 ENCOUNTER — OFFICE VISIT (OUTPATIENT)
Dept: PRIMARY CARE | Facility: CLINIC | Age: 84
End: 2024-04-17
Payer: MEDICARE

## 2024-04-17 DIAGNOSIS — E89.0 POSTOPERATIVE HYPOTHYROIDISM: ICD-10-CM

## 2024-04-17 DIAGNOSIS — Z00.00 ROUTINE GENERAL MEDICAL EXAMINATION AT HEALTH CARE FACILITY: ICD-10-CM

## 2024-04-17 DIAGNOSIS — I73.9 PERIPHERAL VASCULAR DISEASE (CMS-HCC): ICD-10-CM

## 2024-04-17 DIAGNOSIS — E55.9 VITAMIN D DEFICIENCY: Chronic | ICD-10-CM

## 2024-04-17 DIAGNOSIS — Z78.0 ASYMPTOMATIC MENOPAUSAL STATE: ICD-10-CM

## 2024-04-17 DIAGNOSIS — Z71.89 ENCOUNTER FOR CARDIAC RISK COUNSELING: ICD-10-CM

## 2024-04-17 DIAGNOSIS — E03.9 HYPOTHYROIDISM, UNSPECIFIED: ICD-10-CM

## 2024-04-17 DIAGNOSIS — I25.10 ASHD (ARTERIOSCLEROTIC HEART DISEASE): ICD-10-CM

## 2024-04-17 PROCEDURE — 1170F FXNL STATUS ASSESSED: CPT | Performed by: FAMILY MEDICINE

## 2024-04-17 PROCEDURE — 1157F ADVNC CARE PLAN IN RCRD: CPT | Performed by: FAMILY MEDICINE

## 2024-04-17 PROCEDURE — 1123F ACP DISCUSS/DSCN MKR DOCD: CPT | Performed by: FAMILY MEDICINE

## 2024-04-17 PROCEDURE — 3075F SYST BP GE 130 - 139MM HG: CPT | Performed by: FAMILY MEDICINE

## 2024-04-17 PROCEDURE — G0439 PPPS, SUBSEQ VISIT: HCPCS | Performed by: FAMILY MEDICINE

## 2024-04-17 PROCEDURE — G0446 INTENS BEHAVE THER CARDIO DX: HCPCS | Performed by: FAMILY MEDICINE

## 2024-04-17 PROCEDURE — 3078F DIAST BP <80 MM HG: CPT | Performed by: FAMILY MEDICINE

## 2024-04-17 PROCEDURE — 99214 OFFICE O/P EST MOD 30 MIN: CPT | Performed by: FAMILY MEDICINE

## 2024-04-17 PROCEDURE — 1036F TOBACCO NON-USER: CPT | Performed by: FAMILY MEDICINE

## 2024-04-17 PROCEDURE — 1160F RVW MEDS BY RX/DR IN RCRD: CPT | Performed by: FAMILY MEDICINE

## 2024-04-17 PROCEDURE — 1159F MED LIST DOCD IN RCRD: CPT | Performed by: FAMILY MEDICINE

## 2024-04-17 RX ORDER — ROSUVASTATIN CALCIUM 5 MG/1
5 TABLET, COATED ORAL DAILY
Qty: 100 TABLET | Refills: 1 | Status: SHIPPED | OUTPATIENT
Start: 2024-04-17 | End: 2025-05-22

## 2024-04-17 RX ORDER — LEVOTHYROXINE SODIUM 125 UG/1
125 TABLET ORAL DAILY
Qty: 90 TABLET | Refills: 1 | Status: SHIPPED | OUTPATIENT
Start: 2024-04-17

## 2024-04-17 ASSESSMENT — PATIENT HEALTH QUESTIONNAIRE - PHQ9
10. IF YOU CHECKED OFF ANY PROBLEMS, HOW DIFFICULT HAVE THESE PROBLEMS MADE IT FOR YOU TO DO YOUR WORK, TAKE CARE OF THINGS AT HOME, OR GET ALONG WITH OTHER PEOPLE: NOT DIFFICULT AT ALL
SUM OF ALL RESPONSES TO PHQ9 QUESTIONS 1 AND 2: 1
1. LITTLE INTEREST OR PLEASURE IN DOING THINGS: NOT AT ALL
2. FEELING DOWN, DEPRESSED OR HOPELESS: SEVERAL DAYS

## 2024-04-17 ASSESSMENT — ACTIVITIES OF DAILY LIVING (ADL)
TAKING_MEDICATION: INDEPENDENT
BATHING: INDEPENDENT
GROCERY_SHOPPING: INDEPENDENT
DRESSING: INDEPENDENT
DOING_HOUSEWORK: NEEDS ASSISTANCE
MANAGING_FINANCES: INDEPENDENT

## 2024-04-17 NOTE — PATIENT INSTRUCTIONS
Take rosuvastatin 5 mg every other day for 2-3 weeks.  If tolerating without muscle pain, then increase to 5 mg daily.    Check at pharmacy for covid booster, rsv vaccine, shingrix.

## 2024-04-17 NOTE — PROGRESS NOTES
"Subjective   Reason for Visit: Kaleigh Strong is an 83 y.o. female here for a Medicare Wellness visit.     Past Medical, Surgical, and Family History reviewed and updated in chart.         HPI  Here today for follow-up and Medicare wellness visit.  Accompanied by her daughter.  Reports that she is doing well overall.  1.  Dyslipidemia-discontinued pravastatin.  Reports muscle achiness.  2.  Hypothyroidism-stable on current dose of levothyroxine  3.  Hypertension-stable on current regimen of metoprolol plus losartan plus amlodipine.  Denies headaches, lightheadedness, dizziness.  4.  Anxiety-reports symptoms are minimal and infrequent.  She is typically able to work through acute symptoms.  She has not been using alprazolam recently.  Patient Care Team:  Ysabel Braun MD as PCP - General  Ysabel Braun MD as PCP - [employer] PCP  Palomo Morgan MD as Consulting Physician (Cardiology)  James C Ramicone, DO as Consulting Physician (Cardiology)     Review of Systems   Constitutional:  Negative for activity change, appetite change and fatigue.   Respiratory:  Negative for cough, chest tightness and shortness of breath.    Cardiovascular:  Negative for chest pain, palpitations and leg swelling.   Gastrointestinal:  Negative for abdominal pain, constipation and diarrhea.   Neurological:  Negative for dizziness and headaches.       Objective   Vitals:  /78 (BP Location: Left arm, Patient Position: Sitting)   Pulse 72   Temp 36.1 °C (97 °F)   Ht 1.664 m (5' 5.5\")   Wt 71.3 kg (157 lb 3.2 oz)   BMI 25.76 kg/m²       Physical Exam  Vitals reviewed.   Constitutional:       General: She is not in acute distress.     Appearance: Normal appearance. She is normal weight.   HENT:      Head: Normocephalic and atraumatic.      Nose: Nose normal. No congestion or rhinorrhea.      Mouth/Throat:      Mouth: Mucous membranes are moist.      Pharynx: Oropharynx is clear.   Eyes:      Extraocular " Movements: Extraocular movements intact.      Conjunctiva/sclera: Conjunctivae normal.      Pupils: Pupils are equal, round, and reactive to light.   Cardiovascular:      Rate and Rhythm: Normal rate and regular rhythm.      Pulses: Normal pulses.      Heart sounds: Normal heart sounds. No murmur heard.  Pulmonary:      Effort: Pulmonary effort is normal. No respiratory distress.      Breath sounds: Normal breath sounds.   Abdominal:      General: Abdomen is flat. Bowel sounds are normal.      Palpations: Abdomen is soft.      Tenderness: There is no abdominal tenderness.   Musculoskeletal:         General: Normal range of motion.      Cervical back: Normal range of motion and neck supple.      Right lower leg: No edema.      Left lower leg: No edema.   Lymphadenopathy:      Cervical: No cervical adenopathy.   Skin:     General: Skin is warm and dry.   Neurological:      General: No focal deficit present.      Mental Status: She is alert and oriented to person, place, and time.   Psychiatric:         Mood and Affect: Mood normal.         Thought Content: Thought content normal.         Assessment/Plan   Problem List Items Addressed This Visit       Peripheral vascular disease (CMS-HCC)  Stable, currently asymptomatic    ASHD (arteriosclerotic heart disease)    Discussed recommendations given her cardiac history for statin therapy to reduce risk for future cardiovascular events.  Trial of low-dose rosuvastatin to see if able to tolerate.  Will also check vitamin D levels and replace if needed  Relevant Medications    rosuvastatin (Crestor) 5 mg tablet    Other Relevant Orders    CBC and Auto Differential     Other Visit Diagnoses       Postoperative hypothyroidism          Approximate current dose, adjust dose accordingly based on TSH results  Relevant Orders    TSH with reflex to Free T4 if abnormal    Vitamin D deficiency  (Chronic)       Relevant Orders    Vitamin D 25-Hydroxy,Total (for eval of Vitamin D levels)     Asymptomatic menopausal state        Relevant Orders    XR DEXA bone density    Routine general medical examination at health care facility        Hypothyroidism, unspecified        Relevant Medications    levothyroxine (Synthroid, Levoxyl) 125 mcg tablet    Other Relevant Orders    TSH with reflex to Free T4 if abnormal    Encounter for cardiac risk counseling       Cardiac Risk Assessment  Cardiovascular risk was discussed .   ASCVD 10 year risk score: Not calculated based on age, but due to history of her cardiovascular disease, we discussed strategies to reduce risk including optimization of BP control, trial of low-dose rosuvastatin.  Lifestyle modifications recommended, including nutritional choices, exercise, and elimination of habits contributing to risk.   We agreed on a plan to reduce the current cardiovascular risk based on above discussion as needed.    Aspirin use/disuse was discussed and advised to continue as currently tolerating well.

## 2024-04-18 ENCOUNTER — APPOINTMENT (OUTPATIENT)
Dept: CARDIOLOGY | Facility: CLINIC | Age: 84
End: 2024-04-18
Payer: MEDICARE

## 2024-04-20 VITALS
HEIGHT: 66 IN | BODY MASS INDEX: 25.26 KG/M2 | TEMPERATURE: 97 F | WEIGHT: 157.2 LBS | HEART RATE: 72 BPM | DIASTOLIC BLOOD PRESSURE: 78 MMHG | SYSTOLIC BLOOD PRESSURE: 130 MMHG

## 2024-04-20 PROBLEM — F41.9 ANXIETY: Status: ACTIVE | Noted: 2024-04-20

## 2024-04-20 ASSESSMENT — ENCOUNTER SYMPTOMS
DIARRHEA: 0
HEADACHES: 0
CHEST TIGHTNESS: 0
ACTIVITY CHANGE: 0
COUGH: 0
ABDOMINAL PAIN: 0
CONSTIPATION: 0
DIZZINESS: 0
SHORTNESS OF BREATH: 0
APPETITE CHANGE: 0
PALPITATIONS: 0
FATIGUE: 0

## 2024-04-29 ENCOUNTER — HOSPITAL ENCOUNTER (OUTPATIENT)
Dept: CARDIOLOGY | Facility: CLINIC | Age: 84
Discharge: HOME | End: 2024-04-29
Payer: MEDICARE

## 2024-04-29 DIAGNOSIS — Z95.810 PRESENCE OF AUTOMATIC CARDIOVERTER/DEFIBRILLATOR (AICD): ICD-10-CM

## 2024-04-29 DIAGNOSIS — I47.29 PAROXYSMAL VENTRICULAR TACHYCARDIA (MULTI): ICD-10-CM

## 2024-04-29 DIAGNOSIS — I10 PRIMARY HYPERTENSION: ICD-10-CM

## 2024-05-08 ENCOUNTER — HOSPITAL ENCOUNTER (OUTPATIENT)
Dept: CARDIOLOGY | Facility: CLINIC | Age: 84
Discharge: HOME | End: 2024-05-08
Payer: MEDICARE

## 2024-05-08 DIAGNOSIS — I47.29 PAROXYSMAL VENTRICULAR TACHYCARDIA (MULTI): ICD-10-CM

## 2024-05-08 DIAGNOSIS — Z95.810 PRESENCE OF AUTOMATIC CARDIOVERTER/DEFIBRILLATOR (AICD): ICD-10-CM

## 2024-05-08 RX ORDER — LOSARTAN POTASSIUM 50 MG/1
50 TABLET ORAL 2 TIMES DAILY
Qty: 180 TABLET | Refills: 3 | Status: SHIPPED | OUTPATIENT
Start: 2024-05-08

## 2024-05-08 NOTE — TELEPHONE ENCOUNTER
losartan (Cozaar) 50 mg tablet Take 1 tablet (50 mg) by mouth twice daily.     [facility] [address] [place] [address] Phone: [phone]   Fax: [phone]

## 2024-05-14 ENCOUNTER — HOSPITAL ENCOUNTER (OUTPATIENT)
Dept: CARDIOLOGY | Facility: CLINIC | Age: 84
Discharge: HOME | End: 2024-05-14
Payer: MEDICARE

## 2024-05-14 DIAGNOSIS — Z95.810 PRESENCE OF AUTOMATIC CARDIOVERTER/DEFIBRILLATOR (AICD): ICD-10-CM

## 2024-05-14 DIAGNOSIS — I47.29 PAROXYSMAL VENTRICULAR TACHYCARDIA (MULTI): ICD-10-CM

## 2024-06-05 ENCOUNTER — HOSPITAL ENCOUNTER (OUTPATIENT)
Dept: RADIOLOGY | Facility: CLINIC | Age: 84
Discharge: HOME | End: 2024-06-05
Payer: MEDICARE

## 2024-06-05 DIAGNOSIS — Z78.0 ASYMPTOMATIC MENOPAUSAL STATE: ICD-10-CM

## 2024-06-05 PROCEDURE — 77080 DXA BONE DENSITY AXIAL: CPT

## 2024-06-06 ENCOUNTER — TELEPHONE (OUTPATIENT)
Dept: PRIMARY CARE | Facility: CLINIC | Age: 84
End: 2024-06-06
Payer: MEDICARE

## 2024-06-06 NOTE — TELEPHONE ENCOUNTER
----- Message from Ysabel Braun MD sent at 6/5/2024  6:06 PM EDT -----  Bone density is normal  Encourage lifestyle measures to reduce bone loss which  include adequate calcium and vitamin D intake, exercise, fall prevention, and avoidance of heavy alcohol use. In general, women should achieve 1200 mg of elemental calcium daily (total diet plus supplement) and 800 international units of vitamin D daily.

## 2024-06-07 NOTE — TELEPHONE ENCOUNTER
T/c to pt, spoke with pt, advised pt of test results, Bone density is normal  Encouraged lifestyle measures to reduce bone loss which  include adequate calcium and vitamin D intake, exercise, fall prevention, and avoidance of heavy alcohol use. In general, women should achieve 1200 mg of elemental calcium daily (total diet plus supplement) and 800 international units of vitamin D daily. Pt verbalized her understanding

## 2024-06-10 ENCOUNTER — HOSPITAL ENCOUNTER (OUTPATIENT)
Dept: CARDIOLOGY | Facility: CLINIC | Age: 84
Discharge: HOME | End: 2024-06-10
Payer: MEDICARE

## 2024-06-10 DIAGNOSIS — Z95.810 PRESENCE OF AUTOMATIC CARDIOVERTER/DEFIBRILLATOR (AICD): ICD-10-CM

## 2024-06-10 DIAGNOSIS — I47.29 PAROXYSMAL VENTRICULAR TACHYCARDIA (MULTI): ICD-10-CM

## 2024-06-12 ENCOUNTER — APPOINTMENT (OUTPATIENT)
Dept: CARDIOLOGY | Facility: CLINIC | Age: 84
End: 2024-06-12
Payer: MEDICARE

## 2024-06-13 ENCOUNTER — HOSPITAL ENCOUNTER (OUTPATIENT)
Dept: CARDIOLOGY | Facility: CLINIC | Age: 84
Discharge: HOME | End: 2024-06-13
Payer: MEDICARE

## 2024-06-13 DIAGNOSIS — I47.29 PAROXYSMAL VENTRICULAR TACHYCARDIA (MULTI): ICD-10-CM

## 2024-06-13 DIAGNOSIS — Z95.810 PRESENCE OF AUTOMATIC CARDIOVERTER/DEFIBRILLATOR (AICD): ICD-10-CM

## 2024-06-13 PROCEDURE — 93296 REM INTERROG EVL PM/IDS: CPT

## 2024-06-18 ENCOUNTER — HOSPITAL ENCOUNTER (OUTPATIENT)
Dept: CARDIOLOGY | Facility: CLINIC | Age: 84
Discharge: HOME | End: 2024-06-18
Payer: MEDICARE

## 2024-06-18 DIAGNOSIS — I47.29 PAROXYSMAL VENTRICULAR TACHYCARDIA (MULTI): ICD-10-CM

## 2024-06-18 DIAGNOSIS — Z95.810 PRESENCE OF AUTOMATIC CARDIOVERTER/DEFIBRILLATOR (AICD): ICD-10-CM

## 2024-06-27 ENCOUNTER — HOSPITAL ENCOUNTER (OUTPATIENT)
Dept: CARDIOLOGY | Facility: CLINIC | Age: 84
Discharge: HOME | End: 2024-06-27
Payer: MEDICARE

## 2024-06-27 ENCOUNTER — OFFICE VISIT (OUTPATIENT)
Dept: PRIMARY CARE | Facility: CLINIC | Age: 84
End: 2024-06-27
Payer: MEDICARE

## 2024-06-27 ENCOUNTER — HOSPITAL ENCOUNTER (OUTPATIENT)
Dept: RADIOLOGY | Facility: HOSPITAL | Age: 84
Discharge: HOME | End: 2024-06-27
Payer: MEDICARE

## 2024-06-27 VITALS
SYSTOLIC BLOOD PRESSURE: 146 MMHG | TEMPERATURE: 97.2 F | BODY MASS INDEX: 25.78 KG/M2 | HEART RATE: 77 BPM | DIASTOLIC BLOOD PRESSURE: 88 MMHG | WEIGHT: 160.4 LBS | HEIGHT: 66 IN

## 2024-06-27 DIAGNOSIS — E78.5 HYPERLIPIDEMIA, UNSPECIFIED HYPERLIPIDEMIA TYPE: ICD-10-CM

## 2024-06-27 DIAGNOSIS — E66.3 OVERWEIGHT WITH BODY MASS INDEX (BMI) OF 26 TO 26.9 IN ADULT: ICD-10-CM

## 2024-06-27 DIAGNOSIS — E03.9 HYPOTHYROIDISM, UNSPECIFIED TYPE: ICD-10-CM

## 2024-06-27 DIAGNOSIS — R51.9 HEADACHE, UNSPECIFIED HEADACHE TYPE: Primary | ICD-10-CM

## 2024-06-27 DIAGNOSIS — M54.2 CERVICALGIA: ICD-10-CM

## 2024-06-27 DIAGNOSIS — I10 PRIMARY HYPERTENSION: ICD-10-CM

## 2024-06-27 DIAGNOSIS — I47.29 PAROXYSMAL VENTRICULAR TACHYCARDIA (MULTI): ICD-10-CM

## 2024-06-27 DIAGNOSIS — Z95.810 PRESENCE OF AUTOMATIC CARDIOVERTER/DEFIBRILLATOR (AICD): ICD-10-CM

## 2024-06-27 PROCEDURE — 1157F ADVNC CARE PLAN IN RCRD: CPT | Performed by: INTERNAL MEDICINE

## 2024-06-27 PROCEDURE — 72040 X-RAY EXAM NECK SPINE 2-3 VW: CPT | Performed by: RADIOLOGY

## 2024-06-27 PROCEDURE — 1036F TOBACCO NON-USER: CPT | Performed by: INTERNAL MEDICINE

## 2024-06-27 PROCEDURE — 3079F DIAST BP 80-89 MM HG: CPT | Performed by: INTERNAL MEDICINE

## 2024-06-27 PROCEDURE — 3077F SYST BP >= 140 MM HG: CPT | Performed by: INTERNAL MEDICINE

## 2024-06-27 PROCEDURE — 72040 X-RAY EXAM NECK SPINE 2-3 VW: CPT

## 2024-06-27 PROCEDURE — 1159F MED LIST DOCD IN RCRD: CPT | Performed by: INTERNAL MEDICINE

## 2024-06-27 PROCEDURE — 1123F ACP DISCUSS/DSCN MKR DOCD: CPT | Performed by: INTERNAL MEDICINE

## 2024-06-27 PROCEDURE — 99215 OFFICE O/P EST HI 40 MIN: CPT | Performed by: INTERNAL MEDICINE

## 2024-06-27 PROCEDURE — 1160F RVW MEDS BY RX/DR IN RCRD: CPT | Performed by: INTERNAL MEDICINE

## 2024-06-27 RX ORDER — DEXTROMETHORPHAN HYDROBROMIDE, GUAIFENESIN 5; 100 MG/5ML; MG/5ML
650 LIQUID ORAL EVERY 8 HOURS PRN
Qty: 30 TABLET | Refills: 0 | Status: SHIPPED | OUTPATIENT
Start: 2024-06-27 | End: 2024-07-07

## 2024-06-27 ASSESSMENT — PATIENT HEALTH QUESTIONNAIRE - PHQ9
2. FEELING DOWN, DEPRESSED OR HOPELESS: NOT AT ALL
SUM OF ALL RESPONSES TO PHQ9 QUESTIONS 1 AND 2: 0
1. LITTLE INTEREST OR PLEASURE IN DOING THINGS: NOT AT ALL

## 2024-06-27 NOTE — PROGRESS NOTES
"Subjective   Patient ID: Kaleigh Strong is a 83 y.o. female who presents for Pain (Right side of head, off and on x several weeks. Patient stated did not fall).    HPI Pt is a pleasant 84 yo CF who was seen and evaluated during the OV with the hx of the recurrent episodes of headaches with no clear triggering factors for the last 2 weeks. Pt states that \"one side of the head hurts, then another side of the head in pain\". Pt states that she did not have any change of vision/worsening of the hearing function. Pt denies any hx of the falls. Pt denies fecal/urinary incontinence/retention/weakness/numbness/tingling sensation. Pt denies any jaw pain/jaw weakness sensation. Pt also reports the neck pain along with headaches as well. Pt was not able to rate this pain. Pt states that this pain is better with PTC Tylenol.   During the clinical encounter pt denies fever, chills, no SOB, no chest pain/tightness, no abdominal pain, no N/V/D reported, no change of urination reported. Pt denies any other health concerns during this visit.  Sohx: reviewed  List of the medications and allergies: reviewed  PMHx and Fhx: reviewed    Review of Systems  The systems have been reviewed as follows:   Constitutional: no fever, no chills  Eyes: no eyesight problems, no eye redness, no eye pain, no blurred vision  ENT: no ear pain or sore throat, no nasal discharge or congestion, no sinus pressure  Cardiovascular: no chest pain or tightness, no SOB, the heart rate was not fast or slow  Respiratory: no cough, no dyspnea with exertion or with rest, no wheezing  Gastrointestinal: no abdominal pain, no constipation or diarrhea, no heartburn, no nausea or vomiting  Genitourinary: no urine frequency, no dysuria, no urinary urgency, no urinary incontinence or retention  Musculoskeletal: no arthralgia, no joint swelling or stiffness, no muscle weakness, but as mentioned at HPI  Integumentary: no skin lesions or rash  Neurological: no dizziness or " "fainting, no limb weakness, but as mentioned at HPI  Psychiatric: no mood changes, no anxiety or depression, no SI/HI  Endocrine: no weight change, no temperature intolerance, no   change of appetite   Hematologic/Lymphatic: no easy bruising or bleeding    Objective   /88 (BP Location: Right arm, Patient Position: Sitting)   Pulse 77   Temp 36.2 °C (97.2 °F)   Ht 1.664 m (5' 5.5\")   Wt 72.8 kg (160 lb 6.4 oz)   BMI 26.29 kg/m²     Physical Exam  Constitutional: well hydrated, well nourished, no acute distress. Vital signs reviewed  Head and face: normocephalic, atraumatic  Eyes: no erythema, edema or visible abnormalities of conjunctiva or lids. Pupils are equal, round and reactive to light. Extraocular movement normal  ENT: external ears without deformities, hearing aid is in place b/l  Neck: full ROM, no adenopathy, neck was supple  Pulmonary: normal respiratory rate and effort. Lungs are clear to auscultation, no rales,no rhonchi or wheezing  Cardiovascular: RRR, normal S1 and S2, soft midsystolic murmur II/VI audible in APOA, no gallop, posterior tib. pulse normal 2+ bilaterally, no lower extremity edema  Abdomen: soft, non tender on palpation, not distended, normal BS in all 4 quadrants  Musculoskeletal: no joint swelling, normal movements of all 4 extremities. Gait and station: pt is using a walker, Digits and nails: normal without clubbing  Skin: normal color, no rash  Neurologic: Cranial nerves: CN II: visual acuity intact. Source: acuity reported by patient. Pupils reactive to light with normal accommodation. Right and left pupil normal CN III, IV and VI: the EO movements were intact. Deep tendon reflexes: were 2+ and symmetric:  R and L patella.  Sensory exam: normal to light touch  Psychiatric: Mood and affect: normal, Alert and Oriented x 3  Lymphatic: no cervical lymphadenopathy  Assessment/Plan   Headaches:  Hx and PE as mentioned  Will order CT scan of the head without IV " contrast  Cervicalgia:  Hx and PE as mentioned  Will order XR of the cervical spine  Will order acetaminophen 650 mg PO TID PRN for the pain management  Pt was instructed to contact PCP if pt will have no improvement of the condition/worsening of the sxs/new sxs on the current treatment    HTN: continue on the current dose of losartan and amlodipine  HLD; continue on rosuvastatin 5 mg PO daily  Hypothyroidism: continue on levothyroxine 125 mcg po daily  BMI 26: continue with the healthy diet    I discussed every sxs with the pt in detail. Pt verbalized understanding of the health  condition and agreed with the clinical approach as discussed as mentioned above  Please FU with PCP as planned or sooner if needed.

## 2024-07-01 ENCOUNTER — TELEPHONE (OUTPATIENT)
Dept: PRIMARY CARE | Facility: CLINIC | Age: 84
End: 2024-07-01
Payer: MEDICARE

## 2024-07-01 DIAGNOSIS — M54.2 CERVICALGIA: Primary | ICD-10-CM

## 2024-07-01 NOTE — TELEPHONE ENCOUNTER
----- Message from Cece Elise MD sent at 7/1/2024 10:07 AM EDT -----  Please inform the pt that the recent XR results was significant for the age related degenerative changes of the spine. The further evaluation with the ortho team will be beneficial. The referral is in the chart.

## 2024-07-08 ENCOUNTER — TELEPHONE (OUTPATIENT)
Dept: PRIMARY CARE | Facility: CLINIC | Age: 84
End: 2024-07-08
Payer: MEDICARE

## 2024-07-08 ENCOUNTER — HOSPITAL ENCOUNTER (OUTPATIENT)
Dept: RADIOLOGY | Facility: HOSPITAL | Age: 84
Discharge: HOME | End: 2024-07-08
Payer: MEDICARE

## 2024-07-08 DIAGNOSIS — R51.9 HEADACHE, UNSPECIFIED HEADACHE TYPE: ICD-10-CM

## 2024-07-08 PROCEDURE — 70450 CT HEAD/BRAIN W/O DYE: CPT

## 2024-07-08 PROCEDURE — 70450 CT HEAD/BRAIN W/O DYE: CPT | Performed by: RADIOLOGY

## 2024-07-08 NOTE — TELEPHONE ENCOUNTER
----- Message from Cece Elise MD sent at 7/8/2024  2:24 PM EDT -----  The recent CT scan of the head showed age related changes, but no acute findings.

## 2024-07-18 ENCOUNTER — APPOINTMENT (OUTPATIENT)
Dept: CARDIOLOGY | Facility: CLINIC | Age: 84
End: 2024-07-18
Payer: MEDICARE

## 2024-07-19 ENCOUNTER — HOSPITAL ENCOUNTER (OUTPATIENT)
Dept: CARDIOLOGY | Facility: HOSPITAL | Age: 84
Discharge: HOME | End: 2024-07-19
Payer: MEDICARE

## 2024-07-19 DIAGNOSIS — Z95.810 PRESENCE OF AUTOMATIC CARDIOVERTER/DEFIBRILLATOR (AICD): ICD-10-CM

## 2024-07-19 DIAGNOSIS — I47.29 PAROXYSMAL VENTRICULAR TACHYCARDIA (MULTI): ICD-10-CM

## 2024-07-29 DIAGNOSIS — I10 ESSENTIAL (PRIMARY) HYPERTENSION: ICD-10-CM

## 2024-07-29 RX ORDER — AMLODIPINE BESYLATE 2.5 MG/1
2.5 TABLET ORAL 2 TIMES DAILY
Qty: 180 TABLET | Refills: 2 | Status: SHIPPED | OUTPATIENT
Start: 2024-07-29

## 2024-07-30 ENCOUNTER — HOSPITAL ENCOUNTER (OUTPATIENT)
Dept: CARDIOLOGY | Facility: CLINIC | Age: 84
Discharge: HOME | End: 2024-07-30
Payer: MEDICARE

## 2024-07-30 DIAGNOSIS — Z95.810 PRESENCE OF AUTOMATIC CARDIOVERTER/DEFIBRILLATOR (AICD): ICD-10-CM

## 2024-07-30 DIAGNOSIS — I47.29 PAROXYSMAL VENTRICULAR TACHYCARDIA (MULTI): ICD-10-CM

## 2024-08-21 ENCOUNTER — LAB (OUTPATIENT)
Dept: LAB | Facility: LAB | Age: 84
End: 2024-08-21
Payer: MEDICARE

## 2024-08-21 DIAGNOSIS — I25.10 ASHD (ARTERIOSCLEROTIC HEART DISEASE): ICD-10-CM

## 2024-08-21 DIAGNOSIS — E89.0 POSTOPERATIVE HYPOTHYROIDISM: ICD-10-CM

## 2024-08-21 DIAGNOSIS — I50.22 CHRONIC SYSTOLIC HEART FAILURE (MULTI): ICD-10-CM

## 2024-08-21 DIAGNOSIS — E55.9 VITAMIN D DEFICIENCY: Chronic | ICD-10-CM

## 2024-08-21 LAB
25(OH)D3 SERPL-MCNC: 39 NG/ML (ref 30–100)
ALBUMIN SERPL BCP-MCNC: 4.5 G/DL (ref 3.4–5)
ALP SERPL-CCNC: 79 U/L (ref 33–136)
ALT SERPL W P-5'-P-CCNC: 35 U/L (ref 7–45)
ANION GAP SERPL CALC-SCNC: 14 MMOL/L (ref 10–20)
AST SERPL W P-5'-P-CCNC: 53 U/L (ref 9–39)
BASOPHILS # BLD AUTO: 0.09 X10*3/UL (ref 0–0.1)
BASOPHILS NFR BLD AUTO: 1.6 %
BILIRUB SERPL-MCNC: 0.5 MG/DL (ref 0–1.2)
BUN SERPL-MCNC: 16 MG/DL (ref 6–23)
CALCIUM SERPL-MCNC: 9.8 MG/DL (ref 8.6–10.3)
CHLORIDE SERPL-SCNC: 99 MMOL/L (ref 98–107)
CHOLEST SERPL-MCNC: 240 MG/DL (ref 0–199)
CHOLESTEROL/HDL RATIO: 6.1
CO2 SERPL-SCNC: 28 MMOL/L (ref 21–32)
CREAT SERPL-MCNC: 0.78 MG/DL (ref 0.5–1.05)
EGFRCR SERPLBLD CKD-EPI 2021: 75 ML/MIN/1.73M*2
EOSINOPHIL # BLD AUTO: 0.53 X10*3/UL (ref 0–0.4)
EOSINOPHIL NFR BLD AUTO: 9.3 %
ERYTHROCYTE [DISTWIDTH] IN BLOOD BY AUTOMATED COUNT: 13.6 % (ref 11.5–14.5)
GLUCOSE SERPL-MCNC: 101 MG/DL (ref 74–99)
HCT VFR BLD AUTO: 44.6 % (ref 36–46)
HDLC SERPL-MCNC: 39.2 MG/DL
HGB BLD-MCNC: 14.7 G/DL (ref 12–16)
IMM GRANULOCYTES # BLD AUTO: 0.01 X10*3/UL (ref 0–0.5)
IMM GRANULOCYTES NFR BLD AUTO: 0.2 % (ref 0–0.9)
LDLC SERPL CALC-MCNC: ABNORMAL MG/DL
LYMPHOCYTES # BLD AUTO: 1.37 X10*3/UL (ref 0.8–3)
LYMPHOCYTES NFR BLD AUTO: 24 %
MCH RBC QN AUTO: 29.9 PG (ref 26–34)
MCHC RBC AUTO-ENTMCNC: 33 G/DL (ref 32–36)
MCV RBC AUTO: 91 FL (ref 80–100)
MONOCYTES # BLD AUTO: 0.76 X10*3/UL (ref 0.05–0.8)
MONOCYTES NFR BLD AUTO: 13.3 %
NEUTROPHILS # BLD AUTO: 2.94 X10*3/UL (ref 1.6–5.5)
NEUTROPHILS NFR BLD AUTO: 51.6 %
NON HDL CHOLESTEROL: 201 MG/DL (ref 0–149)
NRBC BLD-RTO: 0 /100 WBCS (ref 0–0)
PLATELET # BLD AUTO: 259 X10*3/UL (ref 150–450)
POTASSIUM SERPL-SCNC: 4.7 MMOL/L (ref 3.5–5.3)
PROT SERPL-MCNC: 7.4 G/DL (ref 6.4–8.2)
RBC # BLD AUTO: 4.91 X10*6/UL (ref 4–5.2)
SODIUM SERPL-SCNC: 136 MMOL/L (ref 136–145)
TRIGL SERPL-MCNC: 484 MG/DL (ref 0–149)
TSH SERPL-ACNC: 3.07 MIU/L (ref 0.44–3.98)
VLDL: ABNORMAL
WBC # BLD AUTO: 5.7 X10*3/UL (ref 4.4–11.3)

## 2024-08-21 PROCEDURE — 85025 COMPLETE CBC W/AUTO DIFF WBC: CPT

## 2024-08-21 PROCEDURE — 80053 COMPREHEN METABOLIC PANEL: CPT

## 2024-08-21 PROCEDURE — 36415 COLL VENOUS BLD VENIPUNCTURE: CPT

## 2024-08-21 PROCEDURE — 82306 VITAMIN D 25 HYDROXY: CPT

## 2024-08-21 PROCEDURE — 80061 LIPID PANEL: CPT

## 2024-08-21 PROCEDURE — 84443 ASSAY THYROID STIM HORMONE: CPT

## 2024-08-22 ENCOUNTER — TELEPHONE (OUTPATIENT)
Dept: PRIMARY CARE | Facility: CLINIC | Age: 84
End: 2024-08-22
Payer: MEDICARE

## 2024-08-22 ENCOUNTER — HOSPITAL ENCOUNTER (OUTPATIENT)
Dept: CARDIOLOGY | Facility: CLINIC | Age: 84
Discharge: HOME | End: 2024-08-22
Payer: MEDICARE

## 2024-08-22 DIAGNOSIS — Z95.810 PRESENCE OF AUTOMATIC CARDIOVERTER/DEFIBRILLATOR (AICD): ICD-10-CM

## 2024-08-22 DIAGNOSIS — I47.29 PAROXYSMAL VENTRICULAR TACHYCARDIA (MULTI): ICD-10-CM

## 2024-08-22 NOTE — TELEPHONE ENCOUNTER
----- Message from Ysabel Braun sent at 8/22/2024 12:09 PM EDT -----  Blood count normal, no anemia  Vitamin D normal at 39  TSH normal, continue current dose of levothyroxine

## 2024-08-26 PROBLEM — I47.29 PAROXYSMAL VENTRICULAR TACHYCARDIA (MULTI): Status: RESOLVED | Noted: 2024-03-07 | Resolved: 2024-08-26

## 2024-08-26 PROBLEM — I47.20 PAROXYSMAL VENTRICULAR TACHYCARDIA: Status: RESOLVED | Noted: 2024-03-07 | Resolved: 2024-08-26

## 2024-09-03 ENCOUNTER — HOSPITAL ENCOUNTER (OUTPATIENT)
Dept: CARDIOLOGY | Facility: CLINIC | Age: 84
Discharge: HOME | End: 2024-09-03
Payer: MEDICARE

## 2024-09-03 DIAGNOSIS — I47.29 PAROXYSMAL VENTRICULAR TACHYCARDIA (MULTI): ICD-10-CM

## 2024-09-03 DIAGNOSIS — Z95.810 PRESENCE OF AUTOMATIC CARDIOVERTER/DEFIBRILLATOR (AICD): ICD-10-CM

## 2024-09-08 DIAGNOSIS — E03.9 HYPOTHYROIDISM, UNSPECIFIED: ICD-10-CM

## 2024-09-11 ENCOUNTER — APPOINTMENT (OUTPATIENT)
Dept: CARDIOLOGY | Facility: CLINIC | Age: 84
End: 2024-09-11
Payer: MEDICARE

## 2024-09-13 RX ORDER — LEVOTHYROXINE SODIUM 125 UG/1
125 TABLET ORAL DAILY
Qty: 90 TABLET | Refills: 1 | Status: SHIPPED | OUTPATIENT
Start: 2024-09-13

## 2024-09-15 PROBLEM — Z95.810 ICD (IMPLANTABLE CARDIOVERTER-DEFIBRILLATOR) IN PLACE: Chronic | Status: ACTIVE | Noted: 2024-04-15

## 2024-09-15 PROBLEM — I50.22 CHRONIC SYSTOLIC HEART FAILURE: Chronic | Status: ACTIVE | Noted: 2024-03-13

## 2024-09-15 NOTE — PROGRESS NOTES
"History Of Present Illness:      This is an 83-year-old female with ventricular tachycardia and Abbott ICD insertion.  She was last seen in the office in August 2023.  She has a right sided ICD which was implanted 2021 at [facility].  The left-sided ICD has remained in place, with a fractured lead.  The patient did not want the defibrillator removed.    Past medical history:    Ischemic cardiomyopathy with prior anterior wall MI, ejection fraction 35 to 40%  Ventricular tachycardia, history of ICD insertion 2009, replacement in 2016, and lead fracture 2021.  Left subclavian vein stenosis.  New ICD insertion right infraclavicular region 2021 at [facility]  CAD, with history of stent placement    Review of Systems  Other review of systems negative     Last Recorded Vitals:      3/7/2024    11:05 AM 3/13/2024     2:04 PM 4/15/2024    11:28 AM 4/17/2024    11:50 AM 4/17/2024    11:52 AM 6/27/2024     1:00 PM 9/16/2024     2:50 PM   Vitals   Systolic 119 110 132 151 130 146 114   Diastolic 72 64 66 82 78 88 70   Heart Rate 60 76 65 72  77 65   Temp 35.9 °C (96.6 °F)   36.1 °C (97 °F)  36.2 °C (97.2 °F)    Resp   16       Height (in) 1.702 m (5' 7\") 1.702 m (5' 7\")  1.664 m (5' 5.5\")  1.664 m (5' 5.5\") 1.664 m (5' 5.5\")   Weight (lb) 154 154 158.8 157.2  160.4 157   BMI 24.12 kg/m2 24.12 kg/m2 24.87 kg/m2 25.76 kg/m2  26.29 kg/m2 25.73 kg/m2   BSA (m2) 1.82 m2 1.82 m2 1.84 m2 1.82 m2  1.83 m2 1.81 m2   Visit Report Report Report Report Report Report Report Report     Allergies:  Patient has no known allergies.    Outpatient Medications:  Current Outpatient Medications   Medication Instructions    ALPRAZolam (XANAX) 0.25 mg, oral, 2 times daily, prn    amLODIPine (NORVASC) 2.5 mg, oral, 2 times daily    aspirin 81 mg, oral, Daily    cholecalciferol (VITAMIN D-3) 2,000 Units, oral, Daily    coenzyme Q-10 (CO Q-10) 20 mg, oral, Daily    levothyroxine (SYNTHROID, LEVOXYL) 125 mcg, oral, Daily, as " directed    losartan (COZAAR) 50 mg, oral, 2 times daily    magnesium oxide (Mag-Ox) 400 mg (241.3 mg magnesium) tablet 1 tablet, oral, Daily    metoprolol succinate XL (TOPROL-XL) 50 mg, oral, Daily    mexiletine (MEXITIL) 250 mg, oral, 3 times daily    MULTIVITAMIN ORAL oral, Daily    rosuvastatin (CRESTOR) 5 mg, oral, Daily    vitamin E 800 Units, oral, Daily     Physical Exam:    General Appearance:  Alert, oriented, no distress  Skin:  Warm and dry  Head and Neck:  No elevation of JVP, no carotid bruits  Cardiac Exam:  Rhythm is regular, S1 and S2 are normal, no murmur S3 or S4  Lungs:  Clear to auscultation  Extremities:  no edema  Neurologic:  No focal deficits  Psychiatric:  Appropriate mood and behavior    Cardiology Tests:  I have personally review the diagnostic cardiac testing and my interpretation is as follows:    ICD interrogation: Normal device function  Echocardiogram June 2021: Ejection fraction 35 to 40%    Assessment/Plan   Problem List Items Addressed This Visit             ICD-10-CM    Chronic systolic heart failure (Multi) (Chronic) I50.22     1.  Ventricular tachycardia: The patient has an Abbott ICD in the right pectoral region.  It is functioning appropriately with an estimated battery longevity of 5 years.  The left sided ICD is still in place with a lead fracture.  No plans for extraction given the patient's advanced age.    2.  Ischemic cardiomyopathy: No signs of CHF.  Ejection fraction has been in the range of 35 to 40%.  Continue same medication regimen as outlined by Dr. Morgan.         Relevant Orders    ECG 12 lead (Clinic Performed) (Completed)    ICD (implantable cardioverter-defibrillator) in place - Primary (Chronic) Z95.810     James C Ramicone, DO

## 2024-09-16 ENCOUNTER — HOSPITAL ENCOUNTER (OUTPATIENT)
Dept: CARDIOLOGY | Facility: CLINIC | Age: 84
Discharge: HOME | End: 2024-09-16
Payer: MEDICARE

## 2024-09-16 ENCOUNTER — APPOINTMENT (OUTPATIENT)
Dept: CARDIOLOGY | Facility: CLINIC | Age: 84
End: 2024-09-16
Payer: MEDICARE

## 2024-09-16 VITALS
DIASTOLIC BLOOD PRESSURE: 70 MMHG | OXYGEN SATURATION: 95 % | SYSTOLIC BLOOD PRESSURE: 114 MMHG | HEART RATE: 65 BPM | BODY MASS INDEX: 25.23 KG/M2 | WEIGHT: 157 LBS | HEIGHT: 66 IN

## 2024-09-16 DIAGNOSIS — Z95.810 ICD (IMPLANTABLE CARDIOVERTER-DEFIBRILLATOR) IN PLACE: Primary | ICD-10-CM

## 2024-09-16 DIAGNOSIS — I50.22 CHRONIC SYSTOLIC HEART FAILURE: ICD-10-CM

## 2024-09-16 DIAGNOSIS — Z95.810 PRESENCE OF AUTOMATIC CARDIOVERTER/DEFIBRILLATOR (AICD): ICD-10-CM

## 2024-09-16 DIAGNOSIS — I47.29 PAROXYSMAL VENTRICULAR TACHYCARDIA (MULTI): ICD-10-CM

## 2024-09-16 LAB — BODY SURFACE AREA: 1.81 M2

## 2024-09-16 PROCEDURE — 99213 OFFICE O/P EST LOW 20 MIN: CPT | Performed by: INTERNAL MEDICINE

## 2024-09-16 PROCEDURE — 93296 REM INTERROG EVL PM/IDS: CPT

## 2024-09-16 PROCEDURE — 3078F DIAST BP <80 MM HG: CPT | Performed by: INTERNAL MEDICINE

## 2024-09-16 PROCEDURE — 1157F ADVNC CARE PLAN IN RCRD: CPT | Performed by: INTERNAL MEDICINE

## 2024-09-16 PROCEDURE — 3074F SYST BP LT 130 MM HG: CPT | Performed by: INTERNAL MEDICINE

## 2024-09-16 PROCEDURE — 1123F ACP DISCUSS/DSCN MKR DOCD: CPT | Performed by: INTERNAL MEDICINE

## 2024-09-16 PROCEDURE — 1036F TOBACCO NON-USER: CPT | Performed by: INTERNAL MEDICINE

## 2024-09-16 PROCEDURE — 93010 ELECTROCARDIOGRAM REPORT: CPT | Performed by: INTERNAL MEDICINE

## 2024-09-16 PROCEDURE — 1159F MED LIST DOCD IN RCRD: CPT | Performed by: INTERNAL MEDICINE

## 2024-09-16 PROCEDURE — 93005 ELECTROCARDIOGRAM TRACING: CPT | Mod: 59 | Performed by: INTERNAL MEDICINE

## 2024-09-16 NOTE — ASSESSMENT & PLAN NOTE
1.  Ventricular tachycardia: The patient has an Abbott ICD in the right pectoral region.  It is functioning appropriately with an estimated battery longevity of 5 years.  The left sided ICD is still in place with a lead fracture.  No plans for extraction given the patient's advanced age.    2.  Ischemic cardiomyopathy: No signs of CHF.  Ejection fraction has been in the range of 35 to 40%.  Continue same medication regimen as outlined by Dr. Morgan.

## 2024-09-19 ENCOUNTER — HOSPITAL ENCOUNTER (OUTPATIENT)
Dept: CARDIOLOGY | Facility: CLINIC | Age: 84
Discharge: HOME | End: 2024-09-19
Payer: MEDICARE

## 2024-09-19 DIAGNOSIS — Z95.810 PRESENCE OF AUTOMATIC CARDIOVERTER/DEFIBRILLATOR (AICD): ICD-10-CM

## 2024-09-19 DIAGNOSIS — I47.29 PAROXYSMAL VENTRICULAR TACHYCARDIA (MULTI): ICD-10-CM

## 2024-09-30 ENCOUNTER — HOSPITAL ENCOUNTER (OUTPATIENT)
Dept: CARDIOLOGY | Facility: CLINIC | Age: 84
Discharge: HOME | End: 2024-09-30
Payer: MEDICARE

## 2024-09-30 DIAGNOSIS — Z95.810 PRESENCE OF AUTOMATIC CARDIOVERTER/DEFIBRILLATOR (AICD): ICD-10-CM

## 2024-09-30 DIAGNOSIS — I47.29 PAROXYSMAL VENTRICULAR TACHYCARDIA (MULTI): ICD-10-CM

## 2024-10-03 ENCOUNTER — HOSPITAL ENCOUNTER (OUTPATIENT)
Dept: CARDIOLOGY | Facility: CLINIC | Age: 84
Discharge: HOME | End: 2024-10-03
Payer: MEDICARE

## 2024-10-03 DIAGNOSIS — Z95.810 PRESENCE OF AUTOMATIC CARDIOVERTER/DEFIBRILLATOR (AICD): ICD-10-CM

## 2024-10-03 DIAGNOSIS — I47.29 PAROXYSMAL VENTRICULAR TACHYCARDIA (MULTI): ICD-10-CM

## 2024-10-10 ENCOUNTER — HOSPITAL ENCOUNTER (OUTPATIENT)
Dept: CARDIOLOGY | Facility: CLINIC | Age: 84
Discharge: HOME | End: 2024-10-10
Payer: MEDICARE

## 2024-10-10 DIAGNOSIS — I47.29 PAROXYSMAL VENTRICULAR TACHYCARDIA (MULTI): ICD-10-CM

## 2024-10-10 DIAGNOSIS — Z95.810 PRESENCE OF AUTOMATIC CARDIOVERTER/DEFIBRILLATOR (AICD): ICD-10-CM

## 2024-10-24 ENCOUNTER — HOSPITAL ENCOUNTER (OUTPATIENT)
Dept: CARDIOLOGY | Facility: CLINIC | Age: 84
Discharge: HOME | End: 2024-10-24
Payer: MEDICARE

## 2024-10-24 DIAGNOSIS — I47.29 PAROXYSMAL VENTRICULAR TACHYCARDIA (MULTI): ICD-10-CM

## 2024-10-24 DIAGNOSIS — Z95.810 PRESENCE OF AUTOMATIC CARDIOVERTER/DEFIBRILLATOR (AICD): ICD-10-CM

## 2024-10-29 ENCOUNTER — HOSPITAL ENCOUNTER (OUTPATIENT)
Dept: CARDIOLOGY | Facility: CLINIC | Age: 84
Discharge: HOME | End: 2024-10-29
Payer: MEDICARE

## 2024-10-29 DIAGNOSIS — Z95.810 PRESENCE OF AUTOMATIC CARDIOVERTER/DEFIBRILLATOR (AICD): ICD-10-CM

## 2024-10-29 DIAGNOSIS — I47.29 PAROXYSMAL VENTRICULAR TACHYCARDIA (MULTI): ICD-10-CM

## 2024-11-01 DIAGNOSIS — I25.10 ASHD (ARTERIOSCLEROTIC HEART DISEASE): ICD-10-CM

## 2024-11-04 RX ORDER — ROSUVASTATIN CALCIUM 5 MG/1
5 TABLET, COATED ORAL DAILY
Qty: 100 TABLET | Refills: 1 | Status: SHIPPED | OUTPATIENT
Start: 2024-11-04

## 2024-11-07 ENCOUNTER — HOSPITAL ENCOUNTER (OUTPATIENT)
Dept: CARDIOLOGY | Facility: CLINIC | Age: 84
Discharge: HOME | End: 2024-11-07
Payer: MEDICARE

## 2024-11-07 DIAGNOSIS — Z95.810 PRESENCE OF AUTOMATIC CARDIOVERTER/DEFIBRILLATOR (AICD): ICD-10-CM

## 2024-11-07 DIAGNOSIS — I47.29 PAROXYSMAL VENTRICULAR TACHYCARDIA (MULTI): ICD-10-CM

## 2024-11-21 ENCOUNTER — HOSPITAL ENCOUNTER (OUTPATIENT)
Dept: CARDIOLOGY | Facility: CLINIC | Age: 84
Discharge: HOME | End: 2024-11-21
Payer: MEDICARE

## 2024-11-21 DIAGNOSIS — Z95.810 PRESENCE OF AUTOMATIC CARDIOVERTER/DEFIBRILLATOR (AICD): ICD-10-CM

## 2024-11-21 DIAGNOSIS — I47.29 PAROXYSMAL VENTRICULAR TACHYCARDIA (MULTI): ICD-10-CM

## 2024-12-09 ENCOUNTER — HOSPITAL ENCOUNTER (OUTPATIENT)
Dept: CARDIOLOGY | Facility: CLINIC | Age: 84
Discharge: HOME | End: 2024-12-09
Payer: MEDICARE

## 2024-12-09 DIAGNOSIS — I47.29 PAROXYSMAL VENTRICULAR TACHYCARDIA (MULTI): ICD-10-CM

## 2024-12-09 DIAGNOSIS — Z95.810 PRESENCE OF AUTOMATIC CARDIOVERTER/DEFIBRILLATOR (AICD): ICD-10-CM

## 2024-12-10 ENCOUNTER — HOSPITAL ENCOUNTER (OUTPATIENT)
Dept: CARDIOLOGY | Facility: CLINIC | Age: 84
Discharge: HOME | End: 2024-12-10
Payer: MEDICARE

## 2024-12-10 DIAGNOSIS — I47.29 PAROXYSMAL VENTRICULAR TACHYCARDIA (MULTI): ICD-10-CM

## 2024-12-10 DIAGNOSIS — Z95.810 PRESENCE OF AUTOMATIC CARDIOVERTER/DEFIBRILLATOR (AICD): ICD-10-CM

## 2024-12-30 ENCOUNTER — HOSPITAL ENCOUNTER (OUTPATIENT)
Dept: CARDIOLOGY | Facility: CLINIC | Age: 84
Discharge: HOME | End: 2024-12-30
Payer: MEDICARE

## 2024-12-30 DIAGNOSIS — I47.29 PAROXYSMAL VENTRICULAR TACHYCARDIA (MULTI): ICD-10-CM

## 2024-12-30 DIAGNOSIS — Z95.810 PRESENCE OF AUTOMATIC CARDIOVERTER/DEFIBRILLATOR (AICD): ICD-10-CM

## 2024-12-30 PROCEDURE — 93295 DEV INTERROG REMOTE 1/2/MLT: CPT | Performed by: INTERNAL MEDICINE

## 2024-12-30 PROCEDURE — 93296 REM INTERROG EVL PM/IDS: CPT

## 2025-01-02 ENCOUNTER — HOSPITAL ENCOUNTER (OUTPATIENT)
Dept: CARDIOLOGY | Facility: CLINIC | Age: 85
Discharge: HOME | End: 2025-01-02
Payer: MEDICARE

## 2025-01-02 DIAGNOSIS — I47.29 PAROXYSMAL VENTRICULAR TACHYCARDIA (MULTI): ICD-10-CM

## 2025-01-02 DIAGNOSIS — Z95.810 PRESENCE OF AUTOMATIC CARDIOVERTER/DEFIBRILLATOR (AICD): ICD-10-CM

## 2025-01-06 ENCOUNTER — HOSPITAL ENCOUNTER (OUTPATIENT)
Dept: CARDIOLOGY | Facility: CLINIC | Age: 85
Discharge: HOME | End: 2025-01-06
Payer: MEDICARE

## 2025-01-13 ENCOUNTER — HOSPITAL ENCOUNTER (OUTPATIENT)
Dept: CARDIOLOGY | Facility: CLINIC | Age: 85
Discharge: HOME | End: 2025-01-13
Payer: MEDICARE

## 2025-01-13 DIAGNOSIS — Z95.810 PRESENCE OF AUTOMATIC CARDIOVERTER/DEFIBRILLATOR (AICD): ICD-10-CM

## 2025-01-13 DIAGNOSIS — I47.29 PAROXYSMAL VENTRICULAR TACHYCARDIA (MULTI): ICD-10-CM

## 2025-01-20 ENCOUNTER — HOSPITAL ENCOUNTER (OUTPATIENT)
Dept: CARDIOLOGY | Facility: CLINIC | Age: 85
Discharge: HOME | End: 2025-01-20
Payer: MEDICARE

## 2025-01-20 DIAGNOSIS — I47.29 PAROXYSMAL VENTRICULAR TACHYCARDIA (MULTI): ICD-10-CM

## 2025-01-20 DIAGNOSIS — Z95.810 PRESENCE OF AUTOMATIC CARDIOVERTER/DEFIBRILLATOR (AICD): ICD-10-CM

## 2025-01-23 ENCOUNTER — HOSPITAL ENCOUNTER (OUTPATIENT)
Dept: CARDIOLOGY | Facility: CLINIC | Age: 85
Discharge: HOME | End: 2025-01-23
Payer: MEDICARE

## 2025-01-23 DIAGNOSIS — I50.22 CHRONIC SYSTOLIC HEART FAILURE: ICD-10-CM

## 2025-01-23 DIAGNOSIS — I10 PRIMARY HYPERTENSION: ICD-10-CM

## 2025-01-23 DIAGNOSIS — I25.10 ASHD (ARTERIOSCLEROTIC HEART DISEASE): ICD-10-CM

## 2025-01-23 DIAGNOSIS — I50.32 CHRONIC DIASTOLIC (CONGESTIVE) HEART FAILURE: ICD-10-CM

## 2025-01-23 DIAGNOSIS — I47.29 PAROXYSMAL VENTRICULAR TACHYCARDIA (MULTI): ICD-10-CM

## 2025-01-23 DIAGNOSIS — Z95.810 PRESENCE OF AUTOMATIC CARDIOVERTER/DEFIBRILLATOR (AICD): ICD-10-CM

## 2025-01-23 DIAGNOSIS — I47.10 PAROXYSMAL SUPRAVENTRICULAR TACHYCARDIA (CMS-HCC): ICD-10-CM

## 2025-01-23 RX ORDER — METOPROLOL SUCCINATE 50 MG/1
50 TABLET, EXTENDED RELEASE ORAL DAILY
Qty: 90 TABLET | Refills: 0 | Status: SHIPPED | OUTPATIENT
Start: 2025-01-23

## 2025-01-24 RX ORDER — MEXILETINE HYDROCHLORIDE 250 MG/1
250 CAPSULE ORAL 3 TIMES DAILY
Qty: 270 CAPSULE | Refills: 3 | Status: SHIPPED | OUTPATIENT
Start: 2025-01-24

## 2025-01-28 ENCOUNTER — HOSPITAL ENCOUNTER (OUTPATIENT)
Dept: CARDIOLOGY | Facility: CLINIC | Age: 85
Discharge: HOME | End: 2025-01-28
Payer: MEDICARE

## 2025-01-28 DIAGNOSIS — Z95.810 PRESENCE OF AUTOMATIC CARDIOVERTER/DEFIBRILLATOR (AICD): ICD-10-CM

## 2025-01-28 DIAGNOSIS — I47.29 PAROXYSMAL VENTRICULAR TACHYCARDIA (MULTI): ICD-10-CM

## 2025-02-17 PROBLEM — E66.3 OVERWEIGHT WITH BODY MASS INDEX (BMI) OF 26 TO 26.9 IN ADULT: Status: RESOLVED | Noted: 2024-06-27 | Resolved: 2025-02-17

## 2025-02-18 ENCOUNTER — HOSPITAL ENCOUNTER (OUTPATIENT)
Dept: CARDIOLOGY | Facility: CLINIC | Age: 85
Discharge: HOME | End: 2025-02-18
Payer: MEDICARE

## 2025-02-18 DIAGNOSIS — I47.29 PAROXYSMAL VENTRICULAR TACHYCARDIA (MULTI): ICD-10-CM

## 2025-02-18 DIAGNOSIS — Z95.810 PRESENCE OF AUTOMATIC CARDIOVERTER/DEFIBRILLATOR (AICD): ICD-10-CM

## 2025-03-03 ENCOUNTER — APPOINTMENT (OUTPATIENT)
Dept: CARDIOLOGY | Facility: CLINIC | Age: 85
End: 2025-03-03
Payer: MEDICARE

## 2025-03-10 ENCOUNTER — HOSPITAL ENCOUNTER (OUTPATIENT)
Dept: CARDIOLOGY | Facility: CLINIC | Age: 85
Discharge: HOME | End: 2025-03-10
Payer: MEDICARE

## 2025-03-10 ENCOUNTER — APPOINTMENT (OUTPATIENT)
Dept: CARDIOLOGY | Facility: CLINIC | Age: 85
End: 2025-03-10
Payer: MEDICARE

## 2025-03-10 VITALS
BODY MASS INDEX: 25.4 KG/M2 | HEART RATE: 65 BPM | SYSTOLIC BLOOD PRESSURE: 128 MMHG | RESPIRATION RATE: 16 BRPM | OXYGEN SATURATION: 98 % | DIASTOLIC BLOOD PRESSURE: 70 MMHG | WEIGHT: 155 LBS

## 2025-03-10 DIAGNOSIS — I10 PRIMARY HYPERTENSION: ICD-10-CM

## 2025-03-10 DIAGNOSIS — I47.29 PAROXYSMAL VENTRICULAR TACHYCARDIA (MULTI): ICD-10-CM

## 2025-03-10 DIAGNOSIS — I25.10 ASHD (ARTERIOSCLEROTIC HEART DISEASE): ICD-10-CM

## 2025-03-10 DIAGNOSIS — Z95.810 PRESENCE OF AUTOMATIC CARDIOVERTER/DEFIBRILLATOR (AICD): ICD-10-CM

## 2025-03-10 DIAGNOSIS — I50.32 CHRONIC DIASTOLIC (CONGESTIVE) HEART FAILURE: ICD-10-CM

## 2025-03-10 DIAGNOSIS — I50.22 CHRONIC SYSTOLIC HEART FAILURE: ICD-10-CM

## 2025-03-10 PROCEDURE — 1036F TOBACCO NON-USER: CPT | Performed by: STUDENT IN AN ORGANIZED HEALTH CARE EDUCATION/TRAINING PROGRAM

## 2025-03-10 PROCEDURE — 1123F ACP DISCUSS/DSCN MKR DOCD: CPT | Performed by: STUDENT IN AN ORGANIZED HEALTH CARE EDUCATION/TRAINING PROGRAM

## 2025-03-10 PROCEDURE — 99213 OFFICE O/P EST LOW 20 MIN: CPT | Performed by: STUDENT IN AN ORGANIZED HEALTH CARE EDUCATION/TRAINING PROGRAM

## 2025-03-10 PROCEDURE — 1159F MED LIST DOCD IN RCRD: CPT | Performed by: STUDENT IN AN ORGANIZED HEALTH CARE EDUCATION/TRAINING PROGRAM

## 2025-03-10 PROCEDURE — 1157F ADVNC CARE PLAN IN RCRD: CPT | Performed by: STUDENT IN AN ORGANIZED HEALTH CARE EDUCATION/TRAINING PROGRAM

## 2025-03-10 PROCEDURE — 3078F DIAST BP <80 MM HG: CPT | Performed by: STUDENT IN AN ORGANIZED HEALTH CARE EDUCATION/TRAINING PROGRAM

## 2025-03-10 PROCEDURE — 1160F RVW MEDS BY RX/DR IN RCRD: CPT | Performed by: STUDENT IN AN ORGANIZED HEALTH CARE EDUCATION/TRAINING PROGRAM

## 2025-03-10 PROCEDURE — 3074F SYST BP LT 130 MM HG: CPT | Performed by: STUDENT IN AN ORGANIZED HEALTH CARE EDUCATION/TRAINING PROGRAM

## 2025-03-10 RX ORDER — NITROGLYCERIN 0.4 MG/1
0.4 TABLET SUBLINGUAL EVERY 5 MIN PRN
Qty: 90 TABLET | Refills: 1 | Status: SHIPPED | OUTPATIENT
Start: 2025-03-10

## 2025-03-10 RX ORDER — LOSARTAN POTASSIUM 50 MG/1
50 TABLET ORAL 2 TIMES DAILY
Qty: 180 TABLET | Refills: 3 | Status: SHIPPED | OUTPATIENT
Start: 2025-03-10

## 2025-03-10 RX ORDER — METOPROLOL SUCCINATE 50 MG/1
50 TABLET, EXTENDED RELEASE ORAL DAILY
Qty: 90 TABLET | Refills: 3 | Status: SHIPPED | OUTPATIENT
Start: 2025-03-10 | End: 2026-03-10

## 2025-03-10 RX ORDER — NITROGLYCERIN 0.4 MG/1
0.4 TABLET SUBLINGUAL EVERY 5 MIN PRN
COMMUNITY
End: 2025-03-10 | Stop reason: SDUPTHER

## 2025-03-10 ASSESSMENT — ENCOUNTER SYMPTOMS
PALPITATIONS: 0
ORTHOPNEA: 0
NEAR-SYNCOPE: 0
RESPIRATORY NEGATIVE: 1
NEUROLOGICAL NEGATIVE: 1
SYNCOPE: 0
PSYCHIATRIC NEGATIVE: 1
GASTROINTESTINAL NEGATIVE: 1
DYSPNEA ON EXERTION: 0
ENDOCRINE NEGATIVE: 1
MUSCULOSKELETAL NEGATIVE: 1
ALLERGIC/IMMUNOLOGIC NEGATIVE: 1
CONSTITUTIONAL NEGATIVE: 1
EYES NEGATIVE: 1
PND: 0
HEMATOLOGIC/LYMPHATIC NEGATIVE: 1

## 2025-03-10 NOTE — PROGRESS NOTES
[facility] Outpatient Follow-up Visit     Reason for Visit: ischemic cardiomyopathy     HPI: Kaleigh Strong is a 84 y.o.  female who presents today for a follow-up for ischemic cardiomyopathy. Past medical history of CAD s/p remote anterior MI, ischemic cardiomyopathy, chronic systolic + diastolic heart failure (NYHA II/B; LVEF 35-40% per TTE 6/2021), mild calcific AS, mild mitral stenosis, and Hx ICD ([facility]- s/p RV lead fracture repair summer 2021).      Patient was previously seen in cardiology clinic on 11/9/2022. Patient had no active cardiac complaints at the time. Muscle aches improved after reducing dose of pravastatin. Of note, patient intolerant to high-dose simvastatin, intolerant even of lower doses of atorvastatin, able to tolerate modest doses of pravastatin. Patient's primary cardiologist left, patient referred to re-establish care with cardiology.      Kaleigh presented to cardiology clinic on 3/22/2023. No active cardiac complaints at this time. Patient was switched from Entresto to losartan (entresto was prohibitively expensive).      Kaleigh returned to general cardiology clinic on 4/15/2024. Patient is currently asymptomatic from a CV perspective. Tolerating current medication regiment; no recent heart failure exacerbations or hospitalizations. Appears euvolemic on exam. No recent ICD therapies. No chest pain, dyspnea, orthopnea, PND, syncope, presyncope, or pedal edema.     Kaleigh returned to cardiology clinic on 3/10/2025.  Patient currently asymptomatic from a cardiac perspective.  Euvolemic on exam.  Per patient blood pressure has been running on the lower end of normal.  Will stop amlodipine and continue losartan.  No recent hospitalizations.  No recent ICD therapies.  Per EP ICD is functioning within expected parameters, roughly 5 years battery life left.  Follow-up in general cardiology clinic in 12 months or earlier if needed.    Past Medical History:   She has a past  medical history of Chronic systolic heart failure (Multi) (03/13/2024), ICD (implantable cardioverter-defibrillator) in place (04/15/2024), and Personal history of other diseases of the circulatory system.    Surgical History:   She has a past surgical history that includes Coronary angioplasty (11/05/2014); Kidney surgery (11/05/2014); Other surgical history (11/05/2014); Other surgical history (11/05/2014); Other surgical history (11/17/2021); and Other surgical history (11/22/2021).    Family History:   - Reviewed; non-contributory     Allergies:  Patient has no known allergies.     Social History:   - non-smoker; no illicit drug use    Prior Cardiovascular Testing (Personally Reviewed):     Coronary angiography (2/2019)- demonstrated Left anterior descending artery was totally occluded at the stent site with collateral flow; RCA- mild disease; Stent to left circumflex- widely patent     TTE (6/2021)- Anterior hypokinesis; moderate left ventricular dysfunction with diastolic dysfunction; LVEF 35 to 40%; mild calcific aortic stenosis; mild mitral stenosis     Review of Systems:  Review of Systems   Constitutional: Negative.   HENT: Negative.     Eyes: Negative.    Cardiovascular:  Negative for chest pain, dyspnea on exertion, near-syncope, orthopnea, palpitations, paroxysmal nocturnal dyspnea and syncope.   Respiratory: Negative.     Endocrine: Negative.    Hematologic/Lymphatic: Negative.    Skin: Negative.    Musculoskeletal: Negative.    Gastrointestinal: Negative.    Genitourinary: Negative.    Neurological: Negative.    Psychiatric/Behavioral: Negative.     Allergic/Immunologic: Negative.        Outpatient Medications:    Current Outpatient Medications:     ALPRAZolam (Xanax) 0.25 mg tablet, Take 1 tablet (0.25 mg) by mouth 2 times a day. prn, Disp: , Rfl:     amLODIPine (Norvasc) 2.5 mg tablet, TAKE 1 TABLET BY MOUTH TWICE A DAY, Disp: 180 tablet, Rfl: 2    aspirin 81 mg chewable tablet, Chew 1 tablet (81  mg) once daily., Disp: , Rfl:     cholecalciferol (Vitamin D-3) 50 MCG (2000 UT) tablet, Take 1 tablet (2,000 Units) by mouth once daily., Disp: , Rfl:     coenzyme Q-10 (Co Q-10) 10 mg capsule, Take 2 capsules (20 mg) by mouth once daily., Disp: , Rfl:     levothyroxine (Synthroid, Levoxyl) 125 mcg tablet, TAKE 1 TABLET (125 MCG) BY MOUTH ONCE DAILY. AS DIRECTED, Disp: 90 tablet, Rfl: 1    losartan (Cozaar) 50 mg tablet, Take 1 tablet (50 mg) by mouth 2 times a day., Disp: 180 tablet, Rfl: 3    magnesium oxide (Mag-Ox) 400 mg (241.3 mg magnesium) tablet, Take 1 tablet (400 mg) by mouth once daily., Disp: , Rfl:     metoprolol succinate XL (Toprol-XL) 50 mg 24 hr tablet, TAKE 1 TABLET BY MOUTH EVERY DAY, Disp: 90 tablet, Rfl: 0    mexiletine (Mexitil) 250 mg capsule, TAKE 1 CAPSULE (250 MG) BY MOUTH 3 TIMES A DAY., Disp: 270 capsule, Rfl: 3    MULTIVITAMIN ORAL, Take by mouth once daily., Disp: , Rfl:     nitroglycerin (Nitrostat) 0.4 mg SL tablet, Place 1 tablet (0.4 mg) under the tongue every 5 minutes if needed for chest pain., Disp: , Rfl:     vitamin E 180 mg (400 unit) capsule, Take 2 capsules (800 Units) by mouth once daily., Disp: , Rfl:     rosuvastatin (Crestor) 5 mg tablet, TAKE 1 TABLET BY MOUTH EVERY DAY (Patient not taking: Reported on 3/10/2025), Disp: 100 tablet, Rfl: 1     Last Recorded Vitals  /70   Pulse 65   Resp 16   Wt 70.3 kg (155 lb)   SpO2 98%   BMI 25.40 kg/m²     Physical Exam:    Physical Exam  Constitutional:       General: She is not in acute distress.  HENT:      Head: Normocephalic.      Mouth/Throat:      Mouth: Mucous membranes are moist.   Eyes:      Extraocular Movements: Extraocular movements intact.      Conjunctiva/sclera: Conjunctivae normal.   Neck:      Vascular: No carotid bruit or JVD.   Cardiovascular:      Rate and Rhythm: Normal rate and regular rhythm.      Pulses: Normal pulses.      Heart sounds: Murmur heard.      Systolic murmur is present with a grade  of 2/6.   Pulmonary:      Effort: Pulmonary effort is normal. No respiratory distress.      Breath sounds: Normal breath sounds.   Abdominal:      General: Bowel sounds are normal. There is no distension.      Palpations: Abdomen is soft.   Musculoskeletal:         General: No swelling.   Skin:     General: Skin is warm and dry.   Neurological:      General: No focal deficit present.      Mental Status: She is alert.      Cranial Nerves: No cranial nerve deficit.      Motor: No weakness.   Psychiatric:         Mood and Affect: Mood normal.         Behavior: Behavior normal.         Lab/Radiology/Diagnostic Review:    Labs    Lab Results   Component Value Date    GLUCOSE 101 (H) 08/21/2024    CALCIUM 9.8 08/21/2024     08/21/2024    K 4.7 08/21/2024    CO2 28 08/21/2024    CL 99 08/21/2024    BUN 16 08/21/2024    CREATININE 0.78 08/21/2024       Lab Results   Component Value Date    WBC 5.7 08/21/2024    HGB 14.7 08/21/2024    HCT 44.6 08/21/2024    MCV 91 08/21/2024     08/21/2024       Lab Results   Component Value Date    CHOL 240 (H) 08/21/2024    CHOL 191 05/23/2022    CHOL 208 (H) 07/20/2020     Lab Results   Component Value Date    HDL 39.2 08/21/2024    HDL 37.0 (A) 05/23/2022    HDL 35.0 (A) 07/20/2020     Lab Results   Component Value Date    LDLCALC  08/21/2024      Comment:      The calculation of LDL and VLDL are inaccurate when the Triglycerides are greater than 400 mg/dL or when the patient is non-fasting. If LDL measurement is necessary contact the testing laboratory for an alternative LDL assay.                                  Near   Borderline      AGE      Desirable  Optimal    High     High     Very High     0-19 Y     0 - 109     ---    110-129   >/= 130     ----    20-24 Y     0 - 119     ---    120-159   >/= 160     ----      >24 Y     0 -  99   100-129  130-159   160-189     >/=190       Lab Results   Component Value Date    TRIG 484 (H) 08/21/2024    TRIG 328 (H) 05/23/2022     "TRIG 384 (H) 07/20/2020     No components found for: \"CHOLHDL\"    Lab Results   Component Value Date     (H) 06/13/2021     (H) 06/05/2021       Lab Results   Component Value Date    TSH 3.07 08/21/2024       Assessment:   84 y.o.  female who presents today for a follow-up for ischemic cardiomyopathy. Past medical history of CAD s/p remote anterior MI, ischemic cardiomyopathy, chronic systolic + diastolic heart failure (NYHA II/B; LVEF 35-40% per TTE 6/2021), mild calcific AS, mild mitral stenosis, and Hx ICD ([facility]- s/p RV lead fracture repair summer 2021).     Kaleigh returned to cardiology clinic on 3/10/2025.  Patient currently asymptomatic from a cardiac perspective.  Euvolemic on exam.  Per patient blood pressure has been running on the lower end of normal.  Will stop amlodipine and continue losartan.  No recent hospitalizations.  No recent ICD therapies.  Per EP ICD is functioning within expected parameters, roughly 5 years battery life left.  Follow-up in general cardiology clinic in 12 months or earlier if needed.    Overall Plan:  1) Chronic systolic and diastolic HF (LVEF 35-40%); secondary to ischemic cardiomyopathy- continue losartan, metoprolol succinate, aspirin; add MRA as tolerated     2) Hx CAD s/p remote LCx, LAD PCI- known occluded LAD (fills via collaterals); continue aspirin , beta blockade; patient prefers to defer statin therapy      3) Hx VT; ICD in place- per EP [employer] ICD in the right pectoral region; per last visit 9/2024 estimated battery longevity of 5 years. The left sided ICD is still in place with a lead fracture. No plans for extraction given the patient's advanced age.      4) HTN (goal BP < 130/90 mmHg)- continue losartan, metoprolol     5) Encouraged low sodium diet (< 2 grams daily); regular physical activity     6) DLD- continue pravastatin; dietary and lifestyle modifications      7) Mild AS- per TTE 6/2021; follow with serial imaging / clinically "     Disposition- Follow-up in cardiology clinic in 6-12 momths    Thank you for your visit today. Please contact our office with any questions.     Palomo Morgan MD

## 2025-03-10 NOTE — PATIENT INSTRUCTIONS
We are stopping your amlodipine so that you are on less medication.     We will continue your other heart medications.     Thank you for your visit today. Please contact our office (via [employer] or phone) with any additional questions.     [facility] [place]    Melanie, RN/Clinic Nurse for:    Dr. Cathy Rajan    [address], [address]  [place] [address]    Phone: [phone] Press Option 5 then Option 3 to speak with the Clinic Nurse (Melanie)    _____    To Reach:    Billing Questions -    [phone]  Scheduling / Rescheduling -  Option 1  Refills / Medication Requests -  Option 3  General Office / [place] -  Option 4  Results -     Option 6  Medical Records -    Option 7  Repeat Options -    Option 9

## 2025-03-13 ENCOUNTER — HOSPITAL ENCOUNTER (OUTPATIENT)
Dept: CARDIOLOGY | Facility: CLINIC | Age: 85
Discharge: HOME | End: 2025-03-13
Payer: MEDICARE

## 2025-03-13 DIAGNOSIS — I47.29 OTHER VENTRICULAR TACHYCARDIA: ICD-10-CM

## 2025-03-13 DIAGNOSIS — Z95.0 PRESENCE OF CARDIAC PACEMAKER: ICD-10-CM

## 2025-03-13 PROCEDURE — 93283 PRGRMG EVAL IMPLANTABLE DFB: CPT

## 2025-03-13 PROCEDURE — 93283 PRGRMG EVAL IMPLANTABLE DFB: CPT | Performed by: INTERNAL MEDICINE

## 2025-03-18 ENCOUNTER — HOSPITAL ENCOUNTER (OUTPATIENT)
Dept: CARDIOLOGY | Facility: CLINIC | Age: 85
Discharge: HOME | End: 2025-03-18
Payer: MEDICARE

## 2025-03-18 DIAGNOSIS — I47.29 PAROXYSMAL VENTRICULAR TACHYCARDIA (MULTI): ICD-10-CM

## 2025-03-18 DIAGNOSIS — Z95.810 PRESENCE OF AUTOMATIC CARDIOVERTER/DEFIBRILLATOR (AICD): ICD-10-CM

## 2025-03-27 ENCOUNTER — HOSPITAL ENCOUNTER (OUTPATIENT)
Dept: CARDIOLOGY | Facility: CLINIC | Age: 85
Discharge: HOME | End: 2025-03-27
Payer: MEDICARE

## 2025-03-27 DIAGNOSIS — I47.29 PAROXYSMAL VENTRICULAR TACHYCARDIA: ICD-10-CM

## 2025-03-27 DIAGNOSIS — Z95.810 PRESENCE OF AUTOMATIC CARDIOVERTER/DEFIBRILLATOR (AICD): ICD-10-CM

## 2025-03-28 DIAGNOSIS — I10 PRIMARY HYPERTENSION: ICD-10-CM

## 2025-03-28 DIAGNOSIS — I47.29 PAROXYSMAL VENTRICULAR TACHYCARDIA: ICD-10-CM

## 2025-03-28 DIAGNOSIS — I25.10 ASHD (ARTERIOSCLEROTIC HEART DISEASE): ICD-10-CM

## 2025-03-28 DIAGNOSIS — I50.22 CHRONIC SYSTOLIC HEART FAILURE: ICD-10-CM

## 2025-03-28 DIAGNOSIS — I50.32 CHRONIC DIASTOLIC (CONGESTIVE) HEART FAILURE: ICD-10-CM

## 2025-03-28 RX ORDER — NITROGLYCERIN 0.4 MG/1
0.4 TABLET SUBLINGUAL EVERY 5 MIN PRN
COMMUNITY
Start: 2025-03-28

## 2025-04-14 ENCOUNTER — HOSPITAL ENCOUNTER (OUTPATIENT)
Dept: CARDIOLOGY | Facility: CLINIC | Age: 85
Discharge: HOME | End: 2025-04-14
Payer: MEDICARE

## 2025-04-14 DIAGNOSIS — Z95.810 PRESENCE OF AUTOMATIC CARDIOVERTER/DEFIBRILLATOR (AICD): ICD-10-CM

## 2025-04-14 DIAGNOSIS — I47.20 PAROXYSMAL VENTRICULAR TACHYCARDIA: ICD-10-CM

## 2025-04-17 ENCOUNTER — HOSPITAL ENCOUNTER (OUTPATIENT)
Dept: CARDIOLOGY | Facility: CLINIC | Age: 85
Discharge: HOME | End: 2025-04-17
Payer: MEDICARE

## 2025-04-17 DIAGNOSIS — I47.20 PAROXYSMAL VENTRICULAR TACHYCARDIA: ICD-10-CM

## 2025-04-17 DIAGNOSIS — Z95.810 PRESENCE OF AUTOMATIC CARDIOVERTER/DEFIBRILLATOR (AICD): ICD-10-CM

## 2025-04-23 DIAGNOSIS — E03.9 HYPOTHYROIDISM, UNSPECIFIED: ICD-10-CM

## 2025-04-23 RX ORDER — LEVOTHYROXINE SODIUM 125 UG/1
125 TABLET ORAL DAILY
Qty: 90 TABLET | Refills: 1 | Status: SHIPPED | OUTPATIENT
Start: 2025-04-23

## 2025-04-29 ENCOUNTER — OFFICE VISIT (OUTPATIENT)
Dept: PRIMARY CARE | Facility: CLINIC | Age: 85
End: 2025-04-29
Payer: MEDICARE

## 2025-04-29 VITALS
SYSTOLIC BLOOD PRESSURE: 140 MMHG | WEIGHT: 156 LBS | DIASTOLIC BLOOD PRESSURE: 86 MMHG | HEIGHT: 66 IN | HEART RATE: 69 BPM | BODY MASS INDEX: 25.07 KG/M2

## 2025-04-29 DIAGNOSIS — H92.02 LEFT EAR PAIN: Primary | ICD-10-CM

## 2025-04-29 DIAGNOSIS — H65.192 OTHER NON-RECURRENT ACUTE NONSUPPURATIVE OTITIS MEDIA OF LEFT EAR: ICD-10-CM

## 2025-04-29 PROBLEM — H66.92 LEFT OTITIS MEDIA: Status: ACTIVE | Noted: 2025-04-29

## 2025-04-29 PROCEDURE — 1036F TOBACCO NON-USER: CPT | Performed by: INTERNAL MEDICINE

## 2025-04-29 PROCEDURE — 1158F ADVNC CARE PLAN TLK DOCD: CPT | Performed by: INTERNAL MEDICINE

## 2025-04-29 PROCEDURE — 1123F ACP DISCUSS/DSCN MKR DOCD: CPT | Performed by: INTERNAL MEDICINE

## 2025-04-29 PROCEDURE — 3079F DIAST BP 80-89 MM HG: CPT | Performed by: INTERNAL MEDICINE

## 2025-04-29 PROCEDURE — 3077F SYST BP >= 140 MM HG: CPT | Performed by: INTERNAL MEDICINE

## 2025-04-29 PROCEDURE — 99213 OFFICE O/P EST LOW 20 MIN: CPT | Performed by: INTERNAL MEDICINE

## 2025-04-29 PROCEDURE — 1157F ADVNC CARE PLAN IN RCRD: CPT | Performed by: INTERNAL MEDICINE

## 2025-04-29 PROCEDURE — 1159F MED LIST DOCD IN RCRD: CPT | Performed by: INTERNAL MEDICINE

## 2025-04-29 RX ORDER — NEOMYCIN SULFATE, POLYMYXIN B SULFATE, HYDROCORTISONE 3.5; 10000; 1 MG/ML; [USP'U]/ML; MG/ML
3 SOLUTION/ DROPS AURICULAR (OTIC) 4 TIMES DAILY
Qty: 10 ML | Refills: 0 | Status: SHIPPED | OUTPATIENT
Start: 2025-04-29 | End: 2025-05-16

## 2025-04-29 RX ORDER — AMOXICILLIN AND CLAVULANATE POTASSIUM 875; 125 MG/1; MG/1
875 TABLET, FILM COATED ORAL 2 TIMES DAILY
Qty: 14 TABLET | Refills: 0 | Status: SHIPPED | OUTPATIENT
Start: 2025-04-29 | End: 2025-05-06

## 2025-04-29 ASSESSMENT — LIFESTYLE VARIABLES
HOW MANY STANDARD DRINKS CONTAINING ALCOHOL DO YOU HAVE ON A TYPICAL DAY: PATIENT DOES NOT DRINK
HOW OFTEN DO YOU HAVE SIX OR MORE DRINKS ON ONE OCCASION: NEVER
AUDIT-C TOTAL SCORE: 0
HOW OFTEN DO YOU HAVE A DRINK CONTAINING ALCOHOL: NEVER
HAS A RELATIVE, FRIEND, DOCTOR, OR ANOTHER HEALTH PROFESSIONAL EXPRESSED CONCERN ABOUT YOUR DRINKING OR SUGGESTED YOU CUT DOWN: NO
HAVE YOU OR SOMEONE ELSE BEEN INJURED AS A RESULT OF YOUR DRINKING: NO
SKIP TO QUESTIONS 9-10: 1
AUDIT TOTAL SCORE: 0

## 2025-04-29 ASSESSMENT — PATIENT HEALTH QUESTIONNAIRE - PHQ9
2. FEELING DOWN, DEPRESSED OR HOPELESS: NOT AT ALL
1. LITTLE INTEREST OR PLEASURE IN DOING THINGS: NOT AT ALL
SUM OF ALL RESPONSES TO PHQ9 QUESTIONS 1 AND 2: 0

## 2025-04-29 NOTE — PROGRESS NOTES
Assessment & Plan  Left ear pain  Acute left ear pain likely due to infection or irritation from hearing aid. Prescribed Augmentin for potential bacterial infection and ear drops for local symptoms.  - Advise against using hearing aid for one week.  - Prescribe Augmentin twice daily for seven days.  - Prescribe ear drops three times daily for ten days.  - Recommend yogurt or probiotics to prevent loose stools from Augmentin.  - Advise Tylenol for pain management if needed.    Patient has multiple other medical problems but at this time she seems to be stable.  She understands to follow-up with the primary care physician and the consults for her chronic medical problems.       Assessment/Plan     Problem List Items Addressed This Visit       Left otitis media    Relevant Medications    amoxicillin-clavulanate (Augmentin) 875-125 mg tablet    neomycin-polymyxin-HC (Cortisporin) otic solution    Left ear pain - Primary    Relevant Medications    amoxicillin-clavulanate (Augmentin) 875-125 mg tablet    neomycin-polymyxin-HC (Cortisporin) otic solution       Subjective 0    Patient ID: Kaleigh Strong is a 84 y.o. female who presents for Earache (left).    History of Present Illness  Kaleigh Strong is an 84 year old female who presents with sharp pains in her left ear.  Patient is accompanied by her family.    She has been experiencing sharp, intermittent pains in her left ear since Sunday, described as a cyclical sensation similar to a [place] hitting a bump. The pain is severe, reaching an intensity of eight out of ten, and disrupts her sleep. Today, the pain was particularly intense, prompting her to seek medical attention.    No recent changes in medication, use of Q-tips, or trauma to the ear. No associated symptoms such as cough, cold, fever, or ear discharge. No history of ear infections.    She wears hearing aids in both ears, but wearing the left hearing aid exacerbates the pain. The hearing aids are not new, and  she recently had them cleaned. She has been using Tylenol 650 mg for pain relief, but it is no longer effective.       Surgical History[1]     ROS    Family History[2]   Social History     Socioeconomic History    Marital status:      Spouse name: Not on file    Number of children: Not on file    Years of education: Not on file    Highest education level: Not on file   Occupational History    Not on file   Tobacco Use    Smoking status: Never    Smokeless tobacco: Never   Vaping Use    Vaping status: Never Used   Substance and Sexual Activity    Alcohol use: Never    Drug use: Never    Sexual activity: Not on file   Other Topics Concern    Not on file   Social History Narrative    Not on file     Social Drivers of Health     Financial Resource Strain: Not on file   Food Insecurity: Not on file   Transportation Needs: Not on file   Physical Activity: Not on file   Stress: Not on file   Social Connections: Not on file   Intimate Partner Violence: Not on file   Housing Stability: Not on file      Patient has no known allergies.   Current Rx[3]       Objective     Vitals:    04/29/25 1150   BP: 140/86   Pulse: 69        Physical Exam    Elderly female alert, oriented x3, not in distress.  Not pale, no cyanosis, no icterus. No skin rash  Neck:  Supple.  No JVD.  No clubbing.  Significant peripheral arthritis noted  Throat is not inflamed and no tonsillar exudates. No lymphadenopathy.  Left ear has congested tympanic membrane but no discharge or perforation.  Slight redness noted.  There is mild irritation in the external canal also.  Right ear is normal without any inflammation.  Has significant kyphoscoliosis of the thoracic spine noted  Respiratory System:  Vesicular breathing moderate air entry and breath sounds.  No wheezing and no rales. No pleural rub.  Cardiovascular: Rate is controlled.  Abdomen.  Deferred  Extremities:  Peripheral pulses present.  No edema.  Able to walk with a walker    Problem List  Items Addressed This Visit       Left otitis media    Relevant Medications    amoxicillin-clavulanate (Augmentin) 875-125 mg tablet    neomycin-polymyxin-HC (Cortisporin) otic solution    Left ear pain - Primary    Relevant Medications    amoxicillin-clavulanate (Augmentin) 875-125 mg tablet    neomycin-polymyxin-HC (Cortisporin) otic solution          @LASTLAB[<insert lab base name>:<insert number of results or*for all>,<repeat format for multiple labs>]@  Lab Results   Component Value Date    CHOL 240 (H) 08/21/2024    LDLCALC  08/21/2024      Comment:      The calculation of LDL and VLDL are inaccurate when the Triglycerides are greater than 400 mg/dL or when the patient is non-fasting. If LDL measurement is necessary contact the testing laboratory for an alternative LDL assay.                                  Near   Borderline      AGE      Desirable  Optimal    High     High     Very High     0-19 Y     0 - 109     ---    110-129   >/= 130     ----    20-24 Y     0 - 119     ---    120-159   >/= 160     ----      >24 Y     0 -  99   100-129  130-159   160-189     >/=190      CHHDL 6.1 08/21/2024                 This medical note was created with the assistance of artificial intelligence (AI) for documentation purposes. The content has been reviewed and confirmed by the healthcare provider for accuracy and completeness. Patient consented to the use of audio recording and use of AI during their visit.          [1]   Past Surgical History:  Procedure Laterality Date    CORONARY ANGIOPLASTY  11/05/2014    PTCA    KIDNEY SURGERY  11/05/2014    Kidney Surgery-kidney stone removal    OTHER SURGICAL HISTORY  11/05/2014    Thyroid Surgery Thyroid Lobectomy    OTHER SURGICAL HISTORY  11/05/2014    Implantable Cardioverter-Defibrillator    OTHER SURGICAL HISTORY  11/17/2021    Ablation    OTHER SURGICAL HISTORY  11/22/2021    Cataract surgery   [2] No family history on file.  [3]   Current Outpatient Medications    Medication Sig Dispense Refill    ALPRAZolam (Xanax) 0.25 mg tablet Take 1 tablet (0.25 mg) by mouth 2 times a day. prn      aspirin 81 mg chewable tablet Chew 1 tablet (81 mg) once daily.      cholecalciferol (Vitamin D-3) 50 MCG (2000 UT) tablet Take 1 tablet (2,000 Units) by mouth once daily.      coenzyme Q-10 (Co Q-10) 10 mg capsule Take 2 capsules (20 mg) by mouth once daily.      levothyroxine (Synthroid, Levoxyl) 125 mcg tablet TAKE 1 TABLET (125 MCG) BY MOUTH ONCE DAILY. AS DIRECTED 90 tablet 1    losartan (Cozaar) 50 mg tablet Take 1 tablet (50 mg) by mouth 2 times a day. 180 tablet 3    magnesium oxide (Mag-Ox) 400 mg (241.3 mg magnesium) tablet Take 1 tablet (400 mg) by mouth once daily.      metoprolol succinate XL (Toprol-XL) 50 mg 24 hr tablet Take 1 tablet (50 mg) by mouth once daily. 90 tablet 3    mexiletine (Mexitil) 250 mg capsule TAKE 1 CAPSULE (250 MG) BY MOUTH 3 TIMES A DAY. 270 capsule 3    MULTIVITAMIN ORAL Take by mouth once daily.      nitroglycerin (Nitrostat) 0.4 mg SL tablet Place 1 tablet (0.4 mg) under the tongue every 5 minutes if needed for chest pain. 90 tablet 1    vitamin E 180 mg (400 unit) capsule Take 2 capsules (800 Units) by mouth once daily.      amoxicillin-clavulanate (Augmentin) 875-125 mg tablet Take 1 tablet (875 mg) by mouth 2 times a day for 7 days. 14 tablet 0    neomycin-polymyxin-HC (Cortisporin) otic solution Administer 3 drops into the left ear 4 times a day for 17 days. 10 mL 0     No current facility-administered medications for this visit.

## 2025-04-30 ENCOUNTER — HOSPITAL ENCOUNTER (OUTPATIENT)
Dept: CARDIOLOGY | Facility: CLINIC | Age: 85
Discharge: HOME | End: 2025-04-30
Payer: MEDICARE

## 2025-04-30 DIAGNOSIS — I47.20 PAROXYSMAL VENTRICULAR TACHYCARDIA: ICD-10-CM

## 2025-04-30 DIAGNOSIS — Z95.810 PRESENCE OF AUTOMATIC CARDIOVERTER/DEFIBRILLATOR (AICD): ICD-10-CM

## 2025-05-12 ENCOUNTER — HOSPITAL ENCOUNTER (OUTPATIENT)
Dept: CARDIOLOGY | Facility: CLINIC | Age: 85
Discharge: HOME | End: 2025-05-12
Payer: MEDICARE

## 2025-05-12 DIAGNOSIS — I47.20 PAROXYSMAL VENTRICULAR TACHYCARDIA: ICD-10-CM

## 2025-05-12 DIAGNOSIS — Z95.810 PRESENCE OF AUTOMATIC CARDIOVERTER/DEFIBRILLATOR (AICD): ICD-10-CM

## 2025-05-21 ENCOUNTER — HOSPITAL ENCOUNTER (OUTPATIENT)
Dept: CARDIOLOGY | Facility: CLINIC | Age: 85
Discharge: HOME | End: 2025-05-21
Payer: MEDICARE

## 2025-05-21 DIAGNOSIS — Z95.810 PRESENCE OF AUTOMATIC CARDIOVERTER/DEFIBRILLATOR (AICD): ICD-10-CM

## 2025-05-21 DIAGNOSIS — I47.20 PAROXYSMAL VENTRICULAR TACHYCARDIA: ICD-10-CM

## 2025-06-02 ENCOUNTER — APPOINTMENT (OUTPATIENT)
Dept: PRIMARY CARE | Facility: CLINIC | Age: 85
End: 2025-06-02
Payer: MEDICARE

## 2025-06-02 VITALS
WEIGHT: 155 LBS | HEART RATE: 69 BPM | BODY MASS INDEX: 25.83 KG/M2 | TEMPERATURE: 97 F | HEIGHT: 65 IN | OXYGEN SATURATION: 98 % | DIASTOLIC BLOOD PRESSURE: 78 MMHG | SYSTOLIC BLOOD PRESSURE: 116 MMHG

## 2025-06-02 DIAGNOSIS — E03.9 HYPOTHYROIDISM, UNSPECIFIED TYPE: ICD-10-CM

## 2025-06-02 DIAGNOSIS — I47.10 PAROXYSMAL SUPRAVENTRICULAR TACHYCARDIA: ICD-10-CM

## 2025-06-02 DIAGNOSIS — E03.9 HYPOTHYROIDISM, UNSPECIFIED: ICD-10-CM

## 2025-06-02 DIAGNOSIS — Z23 NEED FOR VACCINATION: ICD-10-CM

## 2025-06-02 DIAGNOSIS — E78.2 MIXED HYPERLIPIDEMIA: ICD-10-CM

## 2025-06-02 DIAGNOSIS — Z00.00 ROUTINE GENERAL MEDICAL EXAMINATION AT HEALTH CARE FACILITY: Primary | ICD-10-CM

## 2025-06-02 DIAGNOSIS — Z78.9 STATIN INTOLERANCE: ICD-10-CM

## 2025-06-02 DIAGNOSIS — I10 PRIMARY HYPERTENSION: ICD-10-CM

## 2025-06-02 DIAGNOSIS — I25.10 ASHD (ARTERIOSCLEROTIC HEART DISEASE): ICD-10-CM

## 2025-06-02 PROBLEM — I49.01 VENTRICULAR FIBRILLATION (MULTI): Status: RESOLVED | Noted: 2024-03-07 | Resolved: 2025-06-02

## 2025-06-02 PROCEDURE — 1170F FXNL STATUS ASSESSED: CPT | Performed by: FAMILY MEDICINE

## 2025-06-02 PROCEDURE — 1159F MED LIST DOCD IN RCRD: CPT | Performed by: FAMILY MEDICINE

## 2025-06-02 PROCEDURE — 99213 OFFICE O/P EST LOW 20 MIN: CPT | Performed by: FAMILY MEDICINE

## 2025-06-02 PROCEDURE — G0439 PPPS, SUBSEQ VISIT: HCPCS | Performed by: FAMILY MEDICINE

## 2025-06-02 PROCEDURE — 1124F ACP DISCUSS-NO DSCNMKR DOCD: CPT | Performed by: FAMILY MEDICINE

## 2025-06-02 PROCEDURE — G2211 COMPLEX E/M VISIT ADD ON: HCPCS | Performed by: FAMILY MEDICINE

## 2025-06-02 PROCEDURE — 3074F SYST BP LT 130 MM HG: CPT | Performed by: FAMILY MEDICINE

## 2025-06-02 PROCEDURE — 3078F DIAST BP <80 MM HG: CPT | Performed by: FAMILY MEDICINE

## 2025-06-02 PROCEDURE — 1036F TOBACCO NON-USER: CPT | Performed by: FAMILY MEDICINE

## 2025-06-02 RX ORDER — LEVOTHYROXINE SODIUM 125 UG/1
125 TABLET ORAL DAILY
Qty: 90 TABLET | Refills: 2 | Status: SHIPPED | OUTPATIENT
Start: 2025-06-02

## 2025-06-02 ASSESSMENT — ENCOUNTER SYMPTOMS
ACTIVITY CHANGE: 0
JOINT SWELLING: 0
DIZZINESS: 0
LOSS OF SENSATION IN FEET: 0
ABDOMINAL PAIN: 0
DIARRHEA: 0
NAUSEA: 0
VOMITING: 0
OCCASIONAL FEELINGS OF UNSTEADINESS: 1
COUGH: 0
CONSTIPATION: 0
SHORTNESS OF BREATH: 0
HEADACHES: 0
DIFFICULTY URINATING: 0
PALPITATIONS: 0
CHEST TIGHTNESS: 0
FATIGUE: 0
UNEXPECTED WEIGHT CHANGE: 0
DEPRESSION: 0

## 2025-06-02 ASSESSMENT — PATIENT HEALTH QUESTIONNAIRE - PHQ9
SUM OF ALL RESPONSES TO PHQ9 QUESTIONS 1 AND 2: 0
2. FEELING DOWN, DEPRESSED OR HOPELESS: NOT AT ALL
SUM OF ALL RESPONSES TO PHQ9 QUESTIONS 1 AND 2: 0
1. LITTLE INTEREST OR PLEASURE IN DOING THINGS: NOT AT ALL
2. FEELING DOWN, DEPRESSED OR HOPELESS: NOT AT ALL
1. LITTLE INTEREST OR PLEASURE IN DOING THINGS: NOT AT ALL

## 2025-06-02 ASSESSMENT — ACTIVITIES OF DAILY LIVING (ADL)
DRESSING: INDEPENDENT
GROCERY_SHOPPING: INDEPENDENT
BATHING: INDEPENDENT
MANAGING_FINANCES: INDEPENDENT
TAKING_MEDICATION: INDEPENDENT
DOING_HOUSEWORK: INDEPENDENT

## 2025-06-02 NOTE — PROGRESS NOTES
Subjective   Reason for Visit: Kaleigh Strong is an 84 y.o. female here for a Medicare Wellness visit.     Past Medical, Surgical, and Family History reviewed and updated in chart.    Reviewed all medications by prescribing practitioner or clinical pharmacist (such as prescriptions, OTCs, herbal therapies and supplements) and documented in the medical record.  Medicare Wellness Billing Compliance Satisfied    *This is a visual tool to show completion of required items on the day of the visit. Green checks will only appear on the date of visit.    Review all medications by prescribing practitioner or clinical pharmacist (such as prescriptions, OTCs, herbal therapies and supplements) documented in the medical record    Past Medical, Surgical, and Family History reviewed and updated in chart    Tobacco Use Reviewed    Alcohol Use Reviewed    Illicit Drug Use Reviewed    PHQ2/9    Falls in Last Year Reviewed    Home Safety Risk Factors Reviewed    Cognitive Impairment Reviewed    Patient Self Assessment and Health Status    Current Diet Reviewed    Exercise Frequency    ADL - Hearing Impairment    ADL - Bathing    ADL - Dressing    ADL - Walks in Home    IADL - Managing Finances    IADL - Grocery Shopping    IADL - Taking Medications    IADL - Doing Housework      HPI  Here today, accompanied by her daughter.  Overall reports that she is doing well.  Continues to follow with cardiology.  Nutrition: balanced diet; avoids added salt/high Na foods. Adequate water intake.   Exercise: walking, seated leg lifts, no other strength/resistance exercise  Sleep: ok sleep onset; occasional wakenings  Eye: utd  Dental: utd      Patient Care Team:  Ysabel Braun MD as PCP - General  Ysabel Braun MD as PCP - [employer] PCP  Paolmo Morgan MD as Consulting Physician (Cardiology)  James C Ramicone, DO as Consulting Physician (Cardiology)     Review of Systems   Constitutional:  Negative  "for activity change, fatigue and unexpected weight change.   Respiratory:  Negative for cough, chest tightness and shortness of breath.    Cardiovascular:  Negative for chest pain, palpitations and leg swelling.   Gastrointestinal:  Negative for abdominal pain, constipation, diarrhea, nausea and vomiting.   Genitourinary:  Negative for difficulty urinating, pelvic pain and vaginal bleeding.   Musculoskeletal:  Negative for joint swelling.   Neurological:  Negative for dizziness and headaches.       Objective   Vitals:  /78 (BP Location: Left arm, Patient Position: Sitting)   Pulse 69   Temp 36.1 °C (97 °F) (Skin)   Ht 1.651 m (5' 5\")   Wt 70.3 kg (155 lb)   SpO2 98%   BMI 25.79 kg/m²       Physical Exam  Vitals reviewed.   Constitutional:       General: She is not in acute distress.     Appearance: Normal appearance. She is normal weight. She is not ill-appearing.   HENT:      Head: Normocephalic and atraumatic.      Right Ear: Tympanic membrane, ear canal and external ear normal. There is no impacted cerumen.      Left Ear: Tympanic membrane, ear canal and external ear normal. There is no impacted cerumen.      Ears:      Comments: Hearing aids removed for examination     Nose: Nose normal. No congestion or rhinorrhea.      Mouth/Throat:      Mouth: Mucous membranes are moist.      Pharynx: Oropharynx is clear.   Eyes:      Extraocular Movements: Extraocular movements intact.      Conjunctiva/sclera: Conjunctivae normal.      Pupils: Pupils are equal, round, and reactive to light.   Cardiovascular:      Rate and Rhythm: Normal rate and regular rhythm.      Pulses: Normal pulses.      Heart sounds: Murmur heard.      Systolic murmur is present with a grade of 2/6.   Pulmonary:      Effort: Pulmonary effort is normal. No respiratory distress.      Breath sounds: Normal breath sounds.   Abdominal:      General: Abdomen is flat. Bowel sounds are normal.      Palpations: Abdomen is soft.      Tenderness: " There is no abdominal tenderness.   Musculoskeletal:         General: Normal range of motion.      Cervical back: Normal range of motion and neck supple.      Right lower leg: No edema.      Left lower leg: No edema.   Lymphadenopathy:      Cervical: No cervical adenopathy.   Skin:     General: Skin is warm and dry.   Neurological:      General: No focal deficit present.      Mental Status: She is alert and oriented to person, place, and time. Mental status is at baseline.   Psychiatric:         Mood and Affect: Mood normal.         Thought Content: Thought content normal.         Assessment & Plan  Routine general medical examination at health care facility  Continue efforts to make healthy food choices  Ensure adequate water intake  Discussed strategies to increaseStrength and resistance based exercise  Reviewed recommended vaccines  Declines breast cancer screening  Bone density testing up-to-date  Orders:    1 Year Follow Up In Primary Care - Wellness Exam; Future    Need for vaccination    Orders:    zoster vaccine-recombinant adjuvanted (Shingrix) 50 mcg/0.5 mL vaccine; Inject 0.5 mL into the muscle 1 time for 1 dose.    diphth,pertus,acell,,tetanus (BoostRIX) 2.5-8-5 Lf-mcg-Lf/0.5mL injection; Inject 0.5 mL into the muscle 1 time for 1 dose.    respiratory syncytial virus, RSV, vaccine, adjuvanted, age 60y+ (Arexvy) 120 mcg/0.5 mL suspension for reconstitution; Inject 0.5 mL into the muscle 1 time for 1 dose.    Statin intolerance  Reports significant myalgias with statin use.       Mixed hyperlipidemia  Check lipids with next lab draw  Orders:    Lipid Panel; Future    Comprehensive Metabolic Panel; Future    Primary hypertension    Orders:    Comprehensive Metabolic Panel; Future    CBC and Auto Differential; Future    Albumin-Creatinine Ratio, Urine Random; Future    Hypothyroidism, unspecified type  Continue levothyroxine, check TSH and adjust dosing accordingly  Orders:    TSH with reflex to Free T4 if  abnormal; Future    ASHD (arteriosclerotic heart disease)    Orders:    Lipid Panel; Future    Hypothyroidism, unspecified    Orders:    levothyroxine (Synthroid, Levoxyl) 125 mcg tablet; Take 1 tablet (125 mcg) by mouth once daily. as directed    Paroxysmal supraventricular tachycardia  Continue to follow with cardiology

## 2025-06-02 NOTE — ASSESSMENT & PLAN NOTE
Continue levothyroxine, check TSH and adjust dosing accordingly  Orders:    TSH with reflex to Free T4 if abnormal; Future

## 2025-06-02 NOTE — ASSESSMENT & PLAN NOTE
Check lipids with next lab draw  Orders:    Lipid Panel; Future    Comprehensive Metabolic Panel; Future

## 2025-06-02 NOTE — ASSESSMENT & PLAN NOTE
Orders:    Comprehensive Metabolic Panel; Future    CBC and Auto Differential; Future    Albumin-Creatinine Ratio, Urine Random; Future

## 2025-06-12 ENCOUNTER — HOSPITAL ENCOUNTER (OUTPATIENT)
Dept: CARDIOLOGY | Facility: CLINIC | Age: 85
Discharge: HOME | End: 2025-06-12
Payer: MEDICARE

## 2025-06-12 DIAGNOSIS — Z95.810 PRESENCE OF AUTOMATIC CARDIOVERTER/DEFIBRILLATOR (AICD): ICD-10-CM

## 2025-06-12 DIAGNOSIS — I47.20 PAROXYSMAL VENTRICULAR TACHYCARDIA: ICD-10-CM

## 2025-06-12 PROCEDURE — 93296 REM INTERROG EVL PM/IDS: CPT

## 2025-07-03 ENCOUNTER — HOSPITAL ENCOUNTER (OUTPATIENT)
Dept: CARDIOLOGY | Facility: CLINIC | Age: 85
Discharge: HOME | End: 2025-07-03
Payer: MEDICARE

## 2025-07-03 DIAGNOSIS — Z95.810 PRESENCE OF AUTOMATIC CARDIOVERTER/DEFIBRILLATOR (AICD): ICD-10-CM

## 2025-07-03 DIAGNOSIS — I47.20 PAROXYSMAL VENTRICULAR TACHYCARDIA: ICD-10-CM

## 2025-07-30 ENCOUNTER — HOSPITAL ENCOUNTER (OUTPATIENT)
Dept: CARDIOLOGY | Facility: CLINIC | Age: 85
Discharge: HOME | End: 2025-07-30
Payer: MEDICARE

## 2025-07-30 DIAGNOSIS — Z95.810 PRESENCE OF AUTOMATIC CARDIOVERTER/DEFIBRILLATOR (AICD): ICD-10-CM

## 2025-07-30 DIAGNOSIS — I47.20 PAROXYSMAL VENTRICULAR TACHYCARDIA: ICD-10-CM

## 2025-08-02 ENCOUNTER — HOSPITAL ENCOUNTER (EMERGENCY)
Facility: HOSPITAL | Age: 85
Discharge: HOME | End: 2025-08-02
Payer: MEDICARE

## 2025-08-02 ENCOUNTER — APPOINTMENT (OUTPATIENT)
Dept: RADIOLOGY | Facility: HOSPITAL | Age: 85
End: 2025-08-02
Payer: MEDICARE

## 2025-08-02 VITALS
RESPIRATION RATE: 16 BRPM | TEMPERATURE: 97.9 F | OXYGEN SATURATION: 95 % | DIASTOLIC BLOOD PRESSURE: 93 MMHG | SYSTOLIC BLOOD PRESSURE: 153 MMHG | HEART RATE: 76 BPM
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
## 2025-08-02 DIAGNOSIS — S22.080A COMPRESSION FRACTURE OF T12 VERTEBRA, INITIAL ENCOUNTER (MULTI): Primary | ICD-10-CM

## 2025-08-02 PROCEDURE — 72128 CT CHEST SPINE W/O DYE: CPT

## 2025-08-02 PROCEDURE — 72128 CT CHEST SPINE W/O DYE: CPT | Performed by: RADIOLOGY

## 2025-08-02 PROCEDURE — 72131 CT LUMBAR SPINE W/O DYE: CPT | Performed by: RADIOLOGY

## 2025-08-02 PROCEDURE — 72125 CT NECK SPINE W/O DYE: CPT | Performed by: RADIOLOGY

## 2025-08-02 PROCEDURE — 70450 CT HEAD/BRAIN W/O DYE: CPT | Performed by: RADIOLOGY

## 2025-08-02 PROCEDURE — 72131 CT LUMBAR SPINE W/O DYE: CPT

## 2025-08-02 PROCEDURE — 99284 EMERGENCY DEPT VISIT MOD MDM: CPT | Mod: 25

## 2025-08-02 PROCEDURE — 72192 CT PELVIS W/O DYE: CPT

## 2025-08-02 PROCEDURE — 70450 CT HEAD/BRAIN W/O DYE: CPT

## 2025-08-02 PROCEDURE — 72192 CT PELVIS W/O DYE: CPT | Performed by: STUDENT IN AN ORGANIZED HEALTH CARE EDUCATION/TRAINING PROGRAM

## 2025-08-02 PROCEDURE — 72125 CT NECK SPINE W/O DYE: CPT

## 2025-08-02 ASSESSMENT — LIFESTYLE VARIABLES
EVER HAD A DRINK FIRST THING IN THE MORNING TO STEADY YOUR NERVES TO GET RID OF A HANGOVER: NO
TOTAL SCORE: 0
HAVE YOU EVER FELT YOU SHOULD CUT DOWN ON YOUR DRINKING: NO
HAVE PEOPLE ANNOYED YOU BY CRITICIZING YOUR DRINKING: NO
EVER FELT BAD OR GUILTY ABOUT YOUR DRINKING: NO

## 2025-08-02 ASSESSMENT — PAIN SCALES - GENERAL: PAINLEVEL_OUTOF10: 5 - MODERATE PAIN

## 2025-08-02 NOTE — DISCHARGE INSTRUCTIONS
You have a compression fracture of your T12 vertebrae.  These do not usually require any intervention but may continue to be painful.  If you continue to have pain in this area please follow-up with a spine surgeon.    --   [facility]   [facility]  Phone: [phone]

## 2025-08-02 NOTE — ED PROVIDER NOTES
Emergency Department Provider Note       History of Present Illness     History provided by: Patient  Limitations to History: None  External Records Reviewed with Brief Summary: Outpatient primary care notes    HPI:  Kaleigh Strong is a 84 y.o. female with history of HTN, HLD, paroxysmal SVT, hypothyroid who presented after fall in garage today.  Reports mechanical fall after tripping.  Denies any preceding symptoms.  Reports she hit the back of her head on the cement as well as landing on her butt.  Does not report bracing herself with hands and denies pain anywhere else other than right iliac and back of head.  Denies blood thinner use other than baby aspirin.    Physical Exam   Triage vitals:  T 36.6 °C (97.9 °F)  HR 70  BP (!) 190/95  RR 18  O2 95 %      Physical Exam  Constitutional:       Appearance: Normal appearance.   HENT:      Head:      Comments: Approximately 4 cm round contusion to back of head.  No breaks in the skin.  Underlying ecchymosis.    Eyes:      Extraocular Movements: Extraocular movements intact.       Cardiovascular:      Rate and Rhythm: Normal rate and regular rhythm.   Pulmonary:      Effort: Pulmonary effort is normal.      Breath sounds: Normal breath sounds.   Abdominal:      Comments: Soft, nondistended, no tenderness to palpation.     Musculoskeletal:      Cervical back: Normal range of motion.      Comments: Patient protanomaly over left iliac crest but in bandlike distribution across lower back at level pelvis.     Skin:     General: Skin is warm and dry.     Neurological:      General: No focal deficit present.      Mental Status: She is alert and oriented to person, place, and time.     Psychiatric:         Mood and Affect: Mood normal.         Behavior: Behavior normal.           Medical Decision Making & ED Course   Medical Decision Makin y.o. female presenting with head and low back pain after fall.  Description of fall mechanical in nature.  Given age, imaging of  head obtained given pain imaging of spine and pelvis obtained.  Imaging notable for T12 compression fracture.  Discussed briefly with neurosurgery who agreed the fracture would not require further management.  Findings, at home treatment, follow-up discussed with patient patient discharged  ----      Differential diagnoses considered include but are not limited to: Intracranial hemorrhage, spinal fracture, pelvic fracture    Social Determinants of Health which Significantly Impact Care: Social Determinants of Health which Significantly Impact Care: None identified     EKG Independent Interpretation: EKG not obtained    Independent Result Review and Interpretation: Relevant laboratory and radiographic results were reviewed and independently interpreted by myself.  As necessary, they are commented on in the ED Course.    Chronic conditions affecting the patient's care: As documented above in [facility]    The patient was discussed with the following consultants/services: Neurosurgery    Care Considerations: As documented above in [facility]    ED Course:  Diagnoses as of 08/02/25 2258   Compression fracture of T12 vertebra, initial encounter (Multi)   Fall, initial encounter       Disposition   As a result of the work-up, the patient was discharged home.  she was informed of her diagnosis and instructed to come back with any concerns or worsening of condition.  she and was agreeable to the plan as discussed above.  she was given the opportunity to ask questions.  All of the patient's questions were answered.    Procedures   Procedures    Patient was seen independently    [facility]ult[facility]  Emergency Medicine                                                       De Fulton  08/02/25 [id number]

## 2025-08-02 NOTE — ED TRIAGE NOTES
Patient to ER via EMS after fall at home. Per pt, she normally uses a walker but was in the garage and didn't take it and then she tripped. Reports pain in lower back. No LOC, no thinners. C-collar in place by EMS

## 2025-08-15 ENCOUNTER — APPOINTMENT (OUTPATIENT)
Dept: PRIMARY CARE | Facility: CLINIC | Age: 85
End: 2025-08-15
Payer: MEDICARE

## 2025-08-15 VITALS
BODY MASS INDEX: 25.16 KG/M2 | WEIGHT: 151 LBS | HEART RATE: 70 BPM | DIASTOLIC BLOOD PRESSURE: 80 MMHG | SYSTOLIC BLOOD PRESSURE: 152 MMHG | TEMPERATURE: 97.5 F | HEIGHT: 65 IN

## 2025-08-15 DIAGNOSIS — E04.1 THYROID NODULE: ICD-10-CM

## 2025-08-15 DIAGNOSIS — N28.9 RENAL LESION: ICD-10-CM

## 2025-08-15 DIAGNOSIS — M85.88 OSTEOPENIA OF SPINE: ICD-10-CM

## 2025-08-15 DIAGNOSIS — Z91.81 HISTORY OF RECENT FALL: Primary | ICD-10-CM

## 2025-08-15 DIAGNOSIS — S22.080D COMPRESSION FRACTURE OF T12 VERTEBRA WITH ROUTINE HEALING, SUBSEQUENT ENCOUNTER: ICD-10-CM

## 2025-08-15 DIAGNOSIS — Z85.72 HISTORY OF B-CELL LYMPHOMA: ICD-10-CM

## 2025-08-15 DIAGNOSIS — I88.0 MESENTERIC LYMPHADENITIS: ICD-10-CM

## 2025-08-15 PROCEDURE — 3079F DIAST BP 80-89 MM HG: CPT | Performed by: FAMILY MEDICINE

## 2025-08-15 PROCEDURE — G2211 COMPLEX E/M VISIT ADD ON: HCPCS | Performed by: FAMILY MEDICINE

## 2025-08-15 PROCEDURE — 1159F MED LIST DOCD IN RCRD: CPT | Performed by: FAMILY MEDICINE

## 2025-08-15 PROCEDURE — 99214 OFFICE O/P EST MOD 30 MIN: CPT | Performed by: FAMILY MEDICINE

## 2025-08-15 PROCEDURE — 3077F SYST BP >= 140 MM HG: CPT | Performed by: FAMILY MEDICINE

## 2025-08-15 ASSESSMENT — PATIENT HEALTH QUESTIONNAIRE - PHQ9
2. FEELING DOWN, DEPRESSED OR HOPELESS: SEVERAL DAYS
1. LITTLE INTEREST OR PLEASURE IN DOING THINGS: NOT AT ALL
SUM OF ALL RESPONSES TO PHQ9 QUESTIONS 1 AND 2: 1

## 2025-08-16 ASSESSMENT — ENCOUNTER SYMPTOMS
DIZZINESS: 0
LIGHT-HEADEDNESS: 0
ACTIVITY CHANGE: 1
CHEST TIGHTNESS: 0
FATIGUE: 0
ABDOMINAL PAIN: 0
BACK PAIN: 0
COUGH: 0
SHORTNESS OF BREATH: 0

## 2025-08-25 ENCOUNTER — HOSPITAL ENCOUNTER (OUTPATIENT)
Dept: CARDIOLOGY | Facility: CLINIC | Age: 85
Discharge: HOME | End: 2025-08-25
Payer: MEDICARE

## 2025-08-25 DIAGNOSIS — I47.20 PAROXYSMAL VENTRICULAR TACHYCARDIA: ICD-10-CM

## 2025-08-25 DIAGNOSIS — Z95.810 PRESENCE OF AUTOMATIC CARDIOVERTER/DEFIBRILLATOR (AICD): ICD-10-CM

## 2025-08-26 DIAGNOSIS — I47.20 PAROXYSMAL VENTRICULAR TACHYCARDIA: Primary | ICD-10-CM

## 2025-08-27 LAB
ALBUMIN SERPL-MCNC: 4.6 G/DL (ref 3.6–5.1)
ALP SERPL-CCNC: 109 U/L (ref 37–153)
ALT SERPL-CCNC: 31 U/L (ref 6–29)
ANION GAP SERPL CALCULATED.4IONS-SCNC: 10 MMOL/L (CALC) (ref 7–17)
AST SERPL-CCNC: 48 U/L (ref 10–35)
BASOPHILS # BLD AUTO: 90 CELLS/UL (ref 0–200)
BASOPHILS NFR BLD AUTO: 1.8 %
BILIRUB SERPL-MCNC: 0.5 MG/DL (ref 0.2–1.2)
BUN SERPL-MCNC: 18 MG/DL (ref 7–25)
CALCIUM SERPL-MCNC: 9.6 MG/DL (ref 8.6–10.4)
CHLORIDE SERPL-SCNC: 99 MMOL/L (ref 98–110)
CHOLEST SERPL-MCNC: 293 MG/DL
CHOLEST/HDLC SERPL: 7.1 (CALC)
CO2 SERPL-SCNC: 23 MMOL/L (ref 20–32)
CREAT SERPL-MCNC: 0.69 MG/DL (ref 0.6–0.95)
EGFRCR SERPLBLD CKD-EPI 2021: 85 ML/MIN/1.73M2
EOSINOPHIL # BLD AUTO: 210 CELLS/UL (ref 15–500)
EOSINOPHIL NFR BLD AUTO: 4.2 %
ERYTHROCYTE [DISTWIDTH] IN BLOOD BY AUTOMATED COUNT: 13.7 % (ref 11–15)
GLUCOSE SERPL-MCNC: 103 MG/DL (ref 65–99)
HCT VFR BLD AUTO: 45.8 % (ref 35–45)
HDLC SERPL-MCNC: 41 MG/DL
HGB BLD-MCNC: 15.3 G/DL (ref 11.7–15.5)
LDLC SERPL CALC-MCNC: ABNORMAL MG/DL
LYMPHOCYTES # BLD AUTO: 1310 CELLS/UL (ref 850–3900)
LYMPHOCYTES NFR BLD AUTO: 26.2 %
MAGNESIUM SERPL-MCNC: 2.4 MG/DL (ref 1.5–2.5)
MCH RBC QN AUTO: 30.1 PG (ref 27–33)
MCHC RBC AUTO-ENTMCNC: 33.4 G/DL (ref 32–36)
MCV RBC AUTO: 90 FL (ref 80–100)
MONOCYTES # BLD AUTO: 555 CELLS/UL (ref 200–950)
MONOCYTES NFR BLD AUTO: 11.1 %
NEUTROPHILS # BLD AUTO: 2835 CELLS/UL (ref 1500–7800)
NEUTROPHILS NFR BLD AUTO: 56.7 %
NONHDLC SERPL-MCNC: 252 MG/DL (CALC)
PLATELET # BLD AUTO: 256 THOUSAND/UL (ref 140–400)
PMV BLD REES-ECKER: 10.5 FL (ref 7.5–12.5)
POTASSIUM SERPL-SCNC: 4.6 MMOL/L (ref 3.5–5.3)
PROT SERPL-MCNC: 7.3 G/DL (ref 6.1–8.1)
RBC # BLD AUTO: 5.09 MILLION/UL (ref 3.8–5.1)
SODIUM SERPL-SCNC: 132 MMOL/L (ref 135–146)
TRIGL SERPL-MCNC: 419 MG/DL
TSH SERPL-ACNC: 2.24 MIU/L (ref 0.4–4.5)
WBC # BLD AUTO: 5 THOUSAND/UL (ref 3.8–10.8)

## 2025-08-28 ENCOUNTER — RESULTS FOLLOW-UP (OUTPATIENT)
Dept: PRIMARY CARE | Facility: CLINIC | Age: 85
End: 2025-08-28
Payer: MEDICARE

## 2025-08-28 DIAGNOSIS — E78.2 MIXED HYPERLIPIDEMIA: Primary | ICD-10-CM

## 2025-08-28 DIAGNOSIS — E78.2 ELEVATED TRIGLYCERIDES WITH HIGH CHOLESTEROL: ICD-10-CM

## 2025-09-02 RX ORDER — ICOSAPENT ETHYL 1 G/1
2 CAPSULE ORAL
Qty: 360 CAPSULE | Refills: 1 | Status: SHIPPED | OUTPATIENT
Start: 2025-09-02 | End: 2026-03-01

## 2026-03-04 ENCOUNTER — APPOINTMENT (OUTPATIENT)
Dept: CARDIOLOGY | Facility: CLINIC | Age: 86
End: 2026-03-04
Payer: MEDICARE

## 2026-06-01 ENCOUNTER — APPOINTMENT (OUTPATIENT)
Dept: PRIMARY CARE | Facility: CLINIC | Age: 86
End: 2026-06-01
Payer: MEDICARE